# Patient Record
Sex: FEMALE | Race: OTHER | HISPANIC OR LATINO | Employment: OTHER | URBAN - METROPOLITAN AREA
[De-identification: names, ages, dates, MRNs, and addresses within clinical notes are randomized per-mention and may not be internally consistent; named-entity substitution may affect disease eponyms.]

---

## 2019-09-20 ENCOUNTER — TELEPHONE (OUTPATIENT)
Dept: UROLOGY | Facility: AMBULATORY SURGERY CENTER | Age: 69
End: 2019-09-20

## 2019-09-20 NOTE — TELEPHONE ENCOUNTER
Spoke with patient  Patient scheduled for 10/3/19 at 8:45 in the [address] with Dr Ector Olguin  Office address and location provided to patient

## 2019-09-20 NOTE — TELEPHONE ENCOUNTER
Reason for appointment/Complaint/Diagnosis : being referred to get appointment for incontinence and loss of bladder function  Insurance:[employer] assured #[id number] secondary access # [id number]  Medicare part a and b    History of Cancer? yes                 If yes, what kind? Lung cancer 11 years ago      Previous urologist?  12 years ago does not remember            Records requested/where?no  Outside testing/where?no  Location Preference for office visit?  [place]

## 2019-10-03 ENCOUNTER — DOCUMENTATION (OUTPATIENT)
Dept: UROLOGY | Facility: CLINIC | Age: 69
End: 2019-10-03
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
## 2019-10-04 NOTE — TELEPHONE ENCOUNTER
Patient scheduled for 10/7/19 at 10:00 with Dr Albert Washington in the [address] [address] office  Please assist in calling patient with appointment time and location

## 2019-10-07 ENCOUNTER — OFFICE VISIT (OUTPATIENT)
Dept: UROLOGY | Facility: CLINIC | Age: 69
End: 2019-10-07
Payer: COMMERCIAL

## 2019-10-07 VITALS
HEIGHT: 60 IN | BODY MASS INDEX: 20.42 KG/M2 | SYSTOLIC BLOOD PRESSURE: 120 MMHG | WEIGHT: 104 LBS | DIASTOLIC BLOOD PRESSURE: 70 MMHG

## 2019-10-07 DIAGNOSIS — C34.02 MALIGNANT NEOPLASM OF HILUS OF LEFT LUNG (HCC): ICD-10-CM

## 2019-10-07 DIAGNOSIS — R39.15 URGENCY OF URINATION: ICD-10-CM

## 2019-10-07 DIAGNOSIS — N39.41 URGE INCONTINENCE: ICD-10-CM

## 2019-10-07 DIAGNOSIS — R32 URINARY INCONTINENCE, UNSPECIFIED TYPE: Primary | ICD-10-CM

## 2019-10-07 LAB
BACTERIA UR QL AUTO: ABNORMAL /HPF
BILIRUB UR QL STRIP: NEGATIVE
CLARITY UR: ABNORMAL
COLOR UR: YELLOW
GLUCOSE UR STRIP-MCNC: NEGATIVE MG/DL
HGB UR QL STRIP.AUTO: NEGATIVE
HYALINE CASTS #/AREA URNS LPF: ABNORMAL /LPF
KETONES UR STRIP-MCNC: NEGATIVE MG/DL
LEUKOCYTE ESTERASE UR QL STRIP: ABNORMAL
NITRITE UR QL STRIP: POSITIVE
NON-SQ EPI CELLS URNS QL MICRO: ABNORMAL /HPF
PH UR STRIP.AUTO: 7 [PH]
PROT UR STRIP-MCNC: NEGATIVE MG/DL
RBC #/AREA URNS AUTO: ABNORMAL /HPF
SL AMB  POCT GLUCOSE, UA: ABNORMAL
SL AMB LEUKOCYTE ESTERASE,UA: ABNORMAL
SL AMB POCT BILIRUBIN,UA: ABNORMAL
SL AMB POCT BLOOD,UA: ABNORMAL
SL AMB POCT CLARITY,UA: CLEAR
SL AMB POCT COLOR,UA: YELLOW
SL AMB POCT KETONES,UA: ABNORMAL
SL AMB POCT NITRITE,UA: POSITIVE
SL AMB POCT PH,UA: 5
SL AMB POCT SPECIFIC GRAVITY,UA: 1.01
SL AMB POCT URINE PROTEIN: ABNORMAL
SL AMB POCT UROBILINOGEN: ABNORMAL
SP GR UR STRIP.AUTO: 1.02 (ref 1–1.03)
UROBILINOGEN UR QL STRIP.AUTO: 0.2 E.U./DL
WBC #/AREA URNS AUTO: ABNORMAL /HPF

## 2019-10-07 PROCEDURE — 87077 CULTURE AEROBIC IDENTIFY: CPT | Performed by: UROLOGY

## 2019-10-07 PROCEDURE — 87086 URINE CULTURE/COLONY COUNT: CPT | Performed by: UROLOGY

## 2019-10-07 PROCEDURE — 87186 SC STD MICRODIL/AGAR DIL: CPT | Performed by: UROLOGY

## 2019-10-07 PROCEDURE — 81001 URINALYSIS AUTO W/SCOPE: CPT | Performed by: UROLOGY

## 2019-10-07 PROCEDURE — 99204 OFFICE O/P NEW MOD 45 MIN: CPT | Performed by: UROLOGY

## 2019-10-07 PROCEDURE — 81002 URINALYSIS NONAUTO W/O SCOPE: CPT | Performed by: UROLOGY

## 2019-10-07 RX ORDER — OXYBUTYNIN CHLORIDE 10 MG/1
10 TABLET, EXTENDED RELEASE ORAL
Qty: 90 TABLET | Refills: 3 | Status: SHIPPED | OUTPATIENT
Start: 2019-10-07 | End: 2019-12-02 | Stop reason: SINTOL

## 2019-10-07 RX ORDER — PANTOPRAZOLE SODIUM 40 MG/1
40 TABLET, DELAYED RELEASE ORAL DAILY
Refills: 6 | COMMUNITY
Start: 2019-09-13

## 2019-10-07 RX ORDER — VARENICLINE TARTRATE 1 MG/1
1 TABLET, FILM COATED ORAL 2 TIMES DAILY WITH MEALS
Refills: 5 | COMMUNITY
Start: 2019-09-25

## 2019-10-07 RX ORDER — ROSUVASTATIN CALCIUM 10 MG/1
10 TABLET, FILM COATED ORAL DAILY
Refills: 2 | COMMUNITY
Start: 2019-09-25

## 2019-10-07 NOTE — PROGRESS NOTES
10/7/2019    Jennie Preston  1950  [id number]        Assessment  Urgency of urination with urge incontinence, occasional fecal incontinence, bilateral lung cancer      Discussion  I recommended trial of oxybutynin 10 mg XL daily  Side effect profile including constipation as well as dry mouth was discussed and reviewed  The patient just completed 50 radiation treatments to her left chest for lung cancer  She will continue to follow with her thoracic Oncology team in [place]  She will return in follow-up in the next 8 weeks to assess the efficacy of the oxybutynin  If she continues to have significant urge incontinence as well as fecal incontinence, I recommend a referral to Dr Robert King to discuss InterStim if her lung cancer is adequately treated  History of Present Illness  68 y o  female with a history of right-sided lung cancer  Approximately 10 years ago she underwent a hanh pneumonectomy  More recently she has recurrence versus a new primary on the left side  She states that she was told she was a poor surgical candidate based on her low lung volume on the right side  She is therefore received 50 external beam radiation therapy treatments  She denies any stress incontinence  She states that her urgency of urination and urge incontinence have been present for many years  She is wearing at least 2 pads per day  She often has fecal incontinence associated with it  AUA Symptom Score      Review of Systems  Review of Systems   Constitutional: Negative  HENT: Negative  Eyes: Negative  Respiratory: Negative  Cardiovascular: Negative  Gastrointestinal: Negative  Endocrine: Negative  Genitourinary:        Per HPI   Musculoskeletal: Negative  Skin: Negative  Allergic/Immunologic: Negative  Neurological: Negative  Hematological: Negative  Psychiatric/Behavioral: Negative  Past Medical History  History reviewed   No pertinent past medical history  Past Social History  History reviewed  No pertinent surgical history  Past Family History  History reviewed  No pertinent family history  Past Social history  Social History     Socioeconomic History    Marital status:       Spouse name: Not on file    Number of children: Not on file    Years of education: Not on file    Highest education level: Not on file   Occupational History    Not on file   Social Needs    Financial resource strain: Not on file    Food insecurity:     Worry: Not on file     Inability: Not on file    Transportation needs:     Medical: Not on file     Non-medical: Not on file   Tobacco Use    Smoking status: Not on file   Substance and Sexual Activity    Alcohol use: Not on file    Drug use: Not on file    Sexual activity: Not on file   Lifestyle    Physical activity:     Days per week: Not on file     Minutes per session: Not on file    Stress: Not on file   Relationships    Social connections:     Talks on phone: Not on file     Gets together: Not on file     Attends Faith service: Not on file     Active member of club or organization: Not on file     Attends meetings of clubs or organizations: Not on file     Relationship status: Not on file    Intimate partner violence:     Fear of current or ex partner: Not on file     Emotionally abused: Not on file     Physically abused: Not on file     Forced sexual activity: Not on file   Other Topics Concern    Not on file   Social History Narrative    Not on file       Current Medications  Current Outpatient Medications   Medication Sig Dispense Refill    CHANTIX 1 MG tablet Take 1 mg by mouth 2 (two) times a day with meals  5    CRESTOR 10 MG tablet Take 10 mg by mouth daily  2    pantoprazole (PROTONIX) 40 mg tablet Take 40 mg by mouth daily  6    oxybutynin (DITROPAN-XL) 10 MG 24 hr tablet Take 1 tablet (10 mg total) by mouth daily at bedtime 90 tablet 3     No current facility-administered medications for this visit  Allergies  Allergies not on file    Past Medical History, Social History, Family History, medications and allergies were reviewed  Vitals  Vitals:    10/07/19 1010   BP: 120/70   Weight: 47 2 kg (104 lb)   Height: 5' (1 524 m)       Physical Exam  Physical Exam    On examination she is in no acute distress  Her abdomen is soft nontender nondistended   examination reveals no CVA tenderness  Her bladder is nonpalpable  Skin is warm  Extremities without edema    Neurologic is grossly intact and nonfocal   Gait normal   Affect normal    Results  No results found for: PSA  No results found for: GLUCOSE, CALCIUM, NA, K, CO2, CL, BUN, CREATININE  No results found for: WBC, HGB, HCT, MCV, PLT      Office Urine Dip  Recent Results (from the past 1 hour(s))   POCT urine dip    Collection Time: 10/07/19 10:19 AM   Result Value Ref Range    LEUKOCYTE ESTERASE,UA -     NITRITE,UA positive     SL AMB POCT UROBILINOGEN -     POCT URINE PROTEIN -      PH,UA 5 0     BLOOD,UA trace     SPECIFIC GRAVITY,UA 1 010     KETONES,UA -     BILIRUBIN,UA -     GLUCOSE, UA -      COLOR,UA yellow     CLARITY,UA clear    ]

## 2019-10-08 ENCOUNTER — TELEPHONE (OUTPATIENT)
Dept: UROLOGY | Facility: AMBULATORY SURGERY CENTER | Age: 69
End: 2019-10-08

## 2019-10-08 DIAGNOSIS — N39.0 URINARY TRACT INFECTION WITHOUT HEMATURIA, SITE UNSPECIFIED: Primary | ICD-10-CM

## 2019-10-08 RX ORDER — CEPHALEXIN 500 MG/1
500 CAPSULE ORAL 3 TIMES DAILY
Qty: 15 CAPSULE | Refills: 0 | Status: SHIPPED | OUTPATIENT
Start: 2019-10-08 | End: 2019-10-13

## 2019-10-08 NOTE — TELEPHONE ENCOUNTER
Patient has a positive urine culture for E coli  I have prescribed Keflex 500 mg t i d  For a 5 day course  Prescription sent electronically to the patient's pharmacy, [facility] in [facility]  Please call patient to tell her to obtain her new prescription

## 2019-10-08 NOTE — TELEPHONE ENCOUNTER
Call placed to patient, advised of above  Encouraged increased water intake  Patient verbalized understanding

## 2019-10-09 LAB — BACTERIA UR CULT: ABNORMAL

## 2019-12-02 ENCOUNTER — OFFICE VISIT (OUTPATIENT)
Dept: UROLOGY | Facility: CLINIC | Age: 69
End: 2019-12-02
Payer: COMMERCIAL

## 2019-12-02 VITALS
HEART RATE: 70 BPM | BODY MASS INDEX: 20.22 KG/M2 | SYSTOLIC BLOOD PRESSURE: 162 MMHG | HEIGHT: 60 IN | WEIGHT: 103 LBS | DIASTOLIC BLOOD PRESSURE: 82 MMHG

## 2019-12-02 DIAGNOSIS — R32 URINARY INCONTINENCE, UNSPECIFIED TYPE: Primary | ICD-10-CM

## 2019-12-02 LAB
POST-VOID RESIDUAL VOLUME, ML POC: 7 ML
SL AMB  POCT GLUCOSE, UA: NORMAL
SL AMB LEUKOCYTE ESTERASE,UA: NORMAL
SL AMB POCT BILIRUBIN,UA: NORMAL
SL AMB POCT BLOOD,UA: NORMAL
SL AMB POCT CLARITY,UA: NORMAL
SL AMB POCT COLOR,UA: YELLOW
SL AMB POCT KETONES,UA: NORMAL
SL AMB POCT NITRITE,UA: NORMAL
SL AMB POCT PH,UA: 6
SL AMB POCT SPECIFIC GRAVITY,UA: 1.02
SL AMB POCT URINE PROTEIN: NORMAL
SL AMB POCT UROBILINOGEN: NORMAL

## 2019-12-02 PROCEDURE — 51798 US URINE CAPACITY MEASURE: CPT | Performed by: UROLOGY

## 2019-12-02 PROCEDURE — 81002 URINALYSIS NONAUTO W/O SCOPE: CPT | Performed by: UROLOGY

## 2019-12-02 PROCEDURE — 99213 OFFICE O/P EST LOW 20 MIN: CPT | Performed by: UROLOGY

## 2019-12-02 NOTE — PROGRESS NOTES
12/2/2019    [facility]  1950  [id number]        Assessment  Lung cancer status post external beam radiation therapy, urgency of urination, possible cystocele      Discussion  I recommend discontinuing the oxybutynin and starting Myrbetriq 25 mg daily  The patient now states she is concerned about possible prolapse and I recommend follow-up in the next 8-12 weeks with cystoscopy to assess the efficacy of the Myrbetriq and to better evaluate her anatomy  History of Present Illness  68 y o  female with a history of lung cancer status post radiation therapy  She has significant urgency and frequency of urination  She recently tried oxybutynin 10 mg XR without success  She returns in follow-up today  She is concerned that her bladder might have dropped    We discussed switching to a 2nd medication and discussed additional options such as Botox and InterStim  She denies any hematuria  She denies stress incontinence  AUA Symptom Score      Review of Systems  Review of Systems   Constitutional: Negative  HENT: Negative  Eyes: Negative  Respiratory: Negative  Cardiovascular: Negative  Gastrointestinal: Negative  Endocrine: Negative  Genitourinary:        Per HPI   Musculoskeletal: Negative  Skin: Negative  Allergic/Immunologic: Negative  Neurological: Negative  Hematological: Negative  Psychiatric/Behavioral: Negative  Past Medical History  History reviewed  No pertinent past medical history  Past Social History  History reviewed  No pertinent surgical history  Past Family History  History reviewed  No pertinent family history  Past Social history  Social History     Socioeconomic History    Marital status:       Spouse name: Not on file    Number of children: Not on file    Years of education: Not on file    Highest education level: Not on file   Occupational History    Not on file   Social Needs    Financial resource strain: Not on file    Food insecurity:     Worry: Not on file     Inability: Not on file    Transportation needs:     Medical: Not on file     Non-medical: Not on file   Tobacco Use    Smoking status: Current Every Day Smoker    Smokeless tobacco: Never Used   Substance and Sexual Activity    Alcohol use: Not on file    Drug use: Not on file    Sexual activity: Not on file   Lifestyle    Physical activity:     Days per week: Not on file     Minutes per session: Not on file    Stress: Not on file   Relationships    Social connections:     Talks on phone: Not on file     Gets together: Not on file     Attends Hinduism service: Not on file     Active member of club or organization: Not on file     Attends meetings of clubs or organizations: Not on file     Relationship status: Not on file    Intimate partner violence:     Fear of current or ex partner: Not on file     Emotionally abused: Not on file     Physically abused: Not on file     Forced sexual activity: Not on file   Other Topics Concern    Not on file   Social History Narrative    Not on file       Current Medications  Current Outpatient Medications   Medication Sig Dispense Refill    CRESTOR 10 MG tablet Take 10 mg by mouth daily  2    CHANTIX 1 MG tablet Take 1 mg by mouth 2 (two) times a day with meals  5    Mirabegron ER (MYRBETRIQ) 25 MG TB24 Take 25 mg by mouth daily 90 tablet 2    pantoprazole (PROTONIX) 40 mg tablet Take 40 mg by mouth daily  6     No current facility-administered medications for this visit  Allergies  No Known Allergies    Past Medical History, Social History, Family History, medications and allergies were reviewed  Vitals  Vitals:    12/02/19 1134   BP: 162/82   Pulse: 70   Weight: 46 7 kg (103 lb)   Height: 5' (1 524 m)       Physical Exam  Physical Exam  On examination she is in no acute distress    Gait normal   Affect normal      Results  No results found for: PSA  No results found for: GLUCOSE, CALCIUM, NA, K, CO2, CL, BUN, CREATININE  No results found for: WBC, HGB, HCT, MCV, PLT      Office Urine Dip  Recent Results (from the past 1 hour(s))   POCT Measure PVR    Collection Time: 12/02/19 11:41 AM   Result Value Ref Range    POST-VOID RESIDUAL VOLUME, ML POC 7 mL   POCT urine dip    Collection Time: 12/02/19 11:43 AM   Result Value Ref Range    LEUKOCYTE ESTERASE,UA -     NITRITE,UA -     SL AMB POCT UROBILINOGEN -     POCT URINE PROTEIN -      PH,UA 6 0     BLOOD,UA -     SPECIFIC GRAVITY,UA 1 020     KETONES,UA -     BILIRUBIN,UA -     GLUCOSE, UA -      COLOR,UA yellow     CLARITY,UA transparent    ]      Total visit time was 15 minutes of which over 50% was spent on counseling

## 2020-02-07 ENCOUNTER — TELEPHONE (OUTPATIENT)
Dept: UROLOGY | Facility: CLINIC | Age: 70
End: 2020-02-07

## 2020-02-07 ENCOUNTER — TELEPHONE (OUTPATIENT)
Dept: UROLOGY | Facility: MEDICAL CENTER | Age: 70
End: 2020-02-07

## 2020-02-07 ENCOUNTER — PROCEDURE VISIT (OUTPATIENT)
Dept: UROLOGY | Facility: CLINIC | Age: 70
End: 2020-02-07
Payer: COMMERCIAL

## 2020-02-07 VITALS
DIASTOLIC BLOOD PRESSURE: 80 MMHG | HEART RATE: 102 BPM | WEIGHT: 103 LBS | BODY MASS INDEX: 20.22 KG/M2 | HEIGHT: 60 IN | SYSTOLIC BLOOD PRESSURE: 138 MMHG

## 2020-02-07 DIAGNOSIS — R32 URINARY INCONTINENCE, UNSPECIFIED TYPE: ICD-10-CM

## 2020-02-07 DIAGNOSIS — N39.0 URINARY TRACT INFECTION WITHOUT HEMATURIA, SITE UNSPECIFIED: Primary | ICD-10-CM

## 2020-02-07 LAB
SL AMB  POCT GLUCOSE, UA: ABNORMAL
SL AMB LEUKOCYTE ESTERASE,UA: ABNORMAL
SL AMB POCT BILIRUBIN,UA: ABNORMAL
SL AMB POCT BLOOD,UA: ABNORMAL
SL AMB POCT CLARITY,UA: CLEAR
SL AMB POCT COLOR,UA: YELLOW
SL AMB POCT KETONES,UA: ABNORMAL
SL AMB POCT NITRITE,UA: POSITIVE
SL AMB POCT PH,UA: 6.5
SL AMB POCT SPECIFIC GRAVITY,UA: 1.01
SL AMB POCT URINE PROTEIN: ABNORMAL
SL AMB POCT UROBILINOGEN: ABNORMAL

## 2020-02-07 PROCEDURE — 87077 CULTURE AEROBIC IDENTIFY: CPT | Performed by: UROLOGY

## 2020-02-07 PROCEDURE — 87186 SC STD MICRODIL/AGAR DIL: CPT | Performed by: UROLOGY

## 2020-02-07 PROCEDURE — 87086 URINE CULTURE/COLONY COUNT: CPT | Performed by: UROLOGY

## 2020-02-07 PROCEDURE — 52000 CYSTOURETHROSCOPY: CPT | Performed by: UROLOGY

## 2020-02-07 PROCEDURE — 81002 URINALYSIS NONAUTO W/O SCOPE: CPT | Performed by: UROLOGY

## 2020-02-07 RX ORDER — CEPHALEXIN 500 MG/1
500 CAPSULE ORAL 3 TIMES DAILY
Qty: 9 CAPSULE | Refills: 0 | Status: SHIPPED | OUTPATIENT
Start: 2020-02-07 | End: 2020-02-10

## 2020-02-07 NOTE — TELEPHONE ENCOUNTER
The patient was seen by Dr Christian Rogers today  The soonest appointment I could find was April 15 in the [place] office  She lives in [place] and said [place] was the closest office for her  She would like to be seen sooner than April  Could someone please check on his schedule and see if the patient can be seen sooner?

## 2020-02-07 NOTE — PROGRESS NOTES
Cystoscopy  Date/Time: 2/7/2020 9:06 AM  Performed by: Agus Donis MD  Authorized by: Agus Donis MD     Procedure details: cystoscopy          Modesta Azar is a 75-year-old female with intractable lower urinary tract symptoms, urgency, and incontinence  She has tried anticholinergics as well as Myrbetriq without success  There is question of a cystocele  She presents today to undergo cystoscopy  She has a history of lung cancer  She states she received chemo radiation a decade ago and is disease free at this time by report  In addition to leaking urine she often has incontinence of her bowels  Cystoscopy Procedure note    Risk and benefits of flexible cystoscopy were discussed  Informed consent was obtained  A urine dip is adequate for cystoscopy  The patient was placed into the modified supine position  Her genitalia was prepped and draped in a sterile fashion  Viscous lidocaine was instilled into the urethra  Flexible cystoscopy was then performed  The bladder was thoroughly inspected  Both ureteral orifices were visualized with clear efflux of urine  The bladder mucosa was thoroughly inspected  The bladder was quite thick walled with multiple trabeculations and a large diverticulum towards the right lateral wall  There was no sign of prolapse  There was no evidence of urothelial carcinoma the bladder  The bladder was filled and as soon as the cystoscope was removed the patient leaked immediately involuntarily  Impression:  Urgency of urination with urge incontinence with bladder trabeculation, bowel incontinence    Plan:  I recommend continuing the Myrbetriq at this time  I recommend a referral to Dr Robert King to see if she would be a candidate for InterStim  She may require urodynamics as well

## 2020-02-07 NOTE — TELEPHONE ENCOUNTER
Patient of Dr Deni Vann seen in [place]  Patient had cysto done today and would like to know if bleeding is normal     Spoke with clinical team in call center and advised patient it is normal for patient to increase water, take antibiotics, and if symptoms persist or worsen to call our on call service  Patient understood and all questions answered

## 2020-02-09 LAB — BACTERIA UR CULT: ABNORMAL

## 2020-02-13 ENCOUNTER — TELEPHONE (OUTPATIENT)
Dept: UROLOGY | Facility: AMBULATORY SURGERY CENTER | Age: 70
End: 2020-02-13

## 2020-02-13 DIAGNOSIS — N30.00 ACUTE CYSTITIS WITHOUT HEMATURIA: Primary | ICD-10-CM

## 2020-02-13 RX ORDER — CEPHALEXIN 500 MG/1
500 CAPSULE ORAL 3 TIMES DAILY
Qty: 15 CAPSULE | Refills: 0 | Status: SHIPPED | OUTPATIENT
Start: 2020-02-13 | End: 2020-02-18

## 2020-02-13 NOTE — TELEPHONE ENCOUNTER
Patito Glover MD  [facility]   Cc: Myra Salmeron, KADEEM             I ordered Abx and did a telephone note but did not route it to anyone by accident  [place] you please call patient and tell her that her urine cx is + and that I sent abx to pharmacy  Nimo Armstrong you     Dr Yanet Martinez

## 2020-02-14 ENCOUNTER — TELEPHONE (OUTPATIENT)
Dept: UROLOGY | Facility: AMBULATORY SURGERY CENTER | Age: 70
End: 2020-02-14

## 2020-02-14 NOTE — TELEPHONE ENCOUNTER
Spoke with Patient and reiterated that urine culture was positive and that an antibiotic was ordered  Patient states that she had picked up the antibiotic last night  Encouraged to take antibiotic as prescribed  Patient repeats back understanding and agrees with plan

## 2020-02-14 NOTE — TELEPHONE ENCOUNTER
----- Message from Libra Sauceda MD sent at 2/13/2020  1:15 PM EST -----  I ordered Abx and did a telephone note but did not route it to anyone by accident  Can you please call patient and tell her that her urine cx is + and that I sent abx to pharmacy  Thank you    Dr Kathy Collazo

## 2020-02-14 NOTE — TELEPHONE ENCOUNTER
LMOM, as per communication consent, that urine culture was positive and antibiotics were ordered through Epic at her preferred pharmacy and she should take them as prescribed per the provider  Encouraged to call office with questions or concerns and office number given

## 2020-02-18 ENCOUNTER — OFFICE VISIT (OUTPATIENT)
Dept: UROLOGY | Facility: AMBULATORY SURGERY CENTER | Age: 70
End: 2020-02-18
Payer: COMMERCIAL

## 2020-02-18 VITALS
WEIGHT: 103 LBS | SYSTOLIC BLOOD PRESSURE: 132 MMHG | BODY MASS INDEX: 20.22 KG/M2 | DIASTOLIC BLOOD PRESSURE: 82 MMHG | HEART RATE: 82 BPM | HEIGHT: 60 IN

## 2020-02-18 DIAGNOSIS — N39.0 URINARY TRACT INFECTION WITHOUT HEMATURIA, SITE UNSPECIFIED: ICD-10-CM

## 2020-02-18 DIAGNOSIS — R32 URINARY INCONTINENCE, UNSPECIFIED TYPE: Primary | ICD-10-CM

## 2020-02-18 LAB
SL AMB  POCT GLUCOSE, UA: NORMAL
SL AMB LEUKOCYTE ESTERASE,UA: NORMAL
SL AMB POCT BILIRUBIN,UA: NORMAL
SL AMB POCT BLOOD,UA: NORMAL
SL AMB POCT CLARITY,UA: CLEAR
SL AMB POCT COLOR,UA: YELLOW
SL AMB POCT KETONES,UA: 1.01
SL AMB POCT NITRITE,UA: NORMAL
SL AMB POCT PH,UA: 6
SL AMB POCT SPECIFIC GRAVITY,UA: 1.01
SL AMB POCT URINE PROTEIN: NORMAL
SL AMB POCT UROBILINOGEN: 0.2

## 2020-02-18 PROCEDURE — 81002 URINALYSIS NONAUTO W/O SCOPE: CPT | Performed by: UROLOGY

## 2020-02-18 PROCEDURE — 99214 OFFICE O/P EST MOD 30 MIN: CPT | Performed by: UROLOGY

## 2020-02-18 NOTE — PROGRESS NOTES
Assessment/Plan:    Urinary incontinence  I had a lengthy discussion with the patient were I discussed options including Botox injection, InterStim sacral neuromodulation, and percutaneous tibial nerve stimulation  Risks and benefits of all options were discussed  The patient would like to think about her options and discussed with her family  She will contact us if she would like to proceed with any of these options  Diagnoses and all orders for this visit:    Urinary incontinence, unspecified type  -     POCT urine dip    Urinary tract infection without hematuria, site unspecified          Total visit time was 25 minutes of which over 50% was spent on counseling  Subjective:     Patient ID: Crow Anguiano is a 68 y o  female    68-year-old female presents for evaluation of refractory urinary urgency and incontinence  She has tried numerous anticholinergic medications and Myrbetriq in the past   She recently had a cystoscopy that demonstrated a trabeculated bladder and diverticulum  She has no other complaints  The following portions of the patient's history were reviewed and updated as appropriate: allergies, current medications, past family history, past medical history, past social history, past surgical history and problem list     Review of Systems   Constitutional: Negative  HENT: Negative  Eyes: Negative  Respiratory: Negative  Cardiovascular: Negative  Gastrointestinal: Negative  Endocrine: Negative  Genitourinary:        As noted per HPI   Musculoskeletal: Negative  Skin: Negative  Allergic/Immunologic: Negative  Neurological: Negative  Hematological: Negative  Psychiatric/Behavioral: Negative  Objective:    Physical Exam   Constitutional: She is oriented to person, place, and time  She appears well-developed and well-nourished  HENT:   Head: Normocephalic and atraumatic  Neck: Normal range of motion  Neck supple  Cardiovascular: Intact distal pulses  Pulmonary/Chest: Effort normal    Abdominal: Soft  Bowel sounds are normal  She exhibits no distension and no mass  There is no tenderness  There is no rebound and no guarding  Musculoskeletal: Normal range of motion  Neurological: She is alert and oriented to person, place, and time  Skin: Skin is warm and dry  Psychiatric: She has a normal mood and affect  Vitals reviewed          Results  No results found for: PSA  No results found for: GLUCOSE, CALCIUM, NA, K, CO2, CL, BUN, CREATININE  No results found for: WBC, HGB, HCT, MCV, PLT    Recent Results (from the past 1 hour(s))   POCT urine dip    Collection Time: 02/18/20  9:38 AM   Result Value Ref Range    LEUKOCYTE ESTERASE,UA NEG     NITRITE,UA NEG     SL AMB POCT UROBILINOGEN 0 2     POCT URINE PROTEIN NEG      PH,UA 6 0     BLOOD,UA NEG     SPECIFIC GRAVITY,UA 1 010     KETONES,UA 1 010     BILIRUBIN,UA NEG     GLUCOSE, UA NEG      COLOR,UA YELLOW     CLARITY,UA CLEAR    ]

## 2020-02-18 NOTE — ASSESSMENT & PLAN NOTE
I had a lengthy discussion with the patient were I discussed options including Botox injection, InterStim sacral neuromodulation, and percutaneous tibial nerve stimulation  Risks and benefits of all options were discussed  The patient would like to think about her options and discussed with her family  She will contact us if she would like to proceed with any of these options

## 2021-07-29 ENCOUNTER — TELEPHONE (OUTPATIENT)
Dept: OTHER | Facility: OTHER | Age: 71
End: 2021-07-29

## 2021-07-29 NOTE — TELEPHONE ENCOUNTER
Patient wants to be seen with Dr simmons, she is having a difficult time with her urinary incontinence  She has to wear 3 pads, she is embarssed and wants a solution to this  She recalls they gave her 3 options to try to help she stated that she does not want those options she wants to have an   operation to life her bladder

## 2021-11-15 ENCOUNTER — TELEPHONE (OUTPATIENT)
Dept: OTHER | Facility: OTHER | Age: 71
End: 2021-11-15

## 2021-11-17 ENCOUNTER — OFFICE VISIT (OUTPATIENT)
Dept: UROLOGY | Facility: CLINIC | Age: 71
End: 2021-11-17
Payer: COMMERCIAL

## 2021-11-17 VITALS
DIASTOLIC BLOOD PRESSURE: 72 MMHG | WEIGHT: 102 LBS | HEART RATE: 78 BPM | BODY MASS INDEX: 19.92 KG/M2 | SYSTOLIC BLOOD PRESSURE: 130 MMHG

## 2021-11-17 DIAGNOSIS — R32 URINARY INCONTINENCE, UNSPECIFIED TYPE: Primary | ICD-10-CM

## 2021-11-17 LAB
BACTERIA UR QL AUTO: ABNORMAL /HPF
BILIRUB UR QL STRIP: NEGATIVE
CAOX CRY URNS QL MICRO: ABNORMAL /HPF
CLARITY UR: CLEAR
COLOR UR: YELLOW
GLUCOSE UR STRIP-MCNC: NEGATIVE MG/DL
HGB UR QL STRIP.AUTO: NEGATIVE
KETONES UR STRIP-MCNC: NEGATIVE MG/DL
LEUKOCYTE ESTERASE UR QL STRIP: ABNORMAL
NITRITE UR QL STRIP: NEGATIVE
NON-SQ EPI CELLS URNS QL MICRO: ABNORMAL /HPF
PH UR STRIP.AUTO: 6 [PH]
PROT UR STRIP-MCNC: NEGATIVE MG/DL
RBC #/AREA URNS AUTO: ABNORMAL /HPF
SL AMB  POCT GLUCOSE, UA: NORMAL
SL AMB LEUKOCYTE ESTERASE,UA: NORMAL
SL AMB POCT BILIRUBIN,UA: NORMAL
SL AMB POCT BLOOD,UA: NORMAL
SL AMB POCT CLARITY,UA: CLEAR
SL AMB POCT COLOR,UA: YELLOW
SL AMB POCT KETONES,UA: NORMAL
SL AMB POCT NITRITE,UA: NORMAL
SL AMB POCT PH,UA: 5
SL AMB POCT SPECIFIC GRAVITY,UA: 1.01
SL AMB POCT URINE PROTEIN: NORMAL
SL AMB POCT UROBILINOGEN: 0.2
SP GR UR STRIP.AUTO: 1.02 (ref 1–1.03)
UROBILINOGEN UR QL STRIP.AUTO: 0.2 E.U./DL
WBC #/AREA URNS AUTO: ABNORMAL /HPF

## 2021-11-17 PROCEDURE — 81001 URINALYSIS AUTO W/SCOPE: CPT | Performed by: NURSE PRACTITIONER

## 2021-11-17 PROCEDURE — 81002 URINALYSIS NONAUTO W/O SCOPE: CPT | Performed by: NURSE PRACTITIONER

## 2021-11-17 PROCEDURE — 99212 OFFICE O/P EST SF 10 MIN: CPT | Performed by: NURSE PRACTITIONER

## 2021-11-17 PROCEDURE — 87086 URINE CULTURE/COLONY COUNT: CPT | Performed by: NURSE PRACTITIONER

## 2021-11-17 RX ORDER — BENZONATATE 100 MG/1
CAPSULE ORAL
COMMUNITY
Start: 2021-07-23 | End: 2022-06-06

## 2021-11-17 RX ORDER — SULFAMETHOXAZOLE AND TRIMETHOPRIM 800; 160 MG/1; MG/1
TABLET ORAL
COMMUNITY
Start: 2021-09-15 | End: 2022-06-06

## 2021-11-17 RX ORDER — DICLOFENAC SODIUM 75 MG/1
TABLET, DELAYED RELEASE ORAL
COMMUNITY
Start: 2021-05-24

## 2021-11-17 RX ORDER — LORATADINE 10 MG/1
CAPSULE, LIQUID FILLED ORAL
COMMUNITY
Start: 2021-07-23

## 2021-11-17 RX ORDER — FLUTICASONE PROPIONATE 50 MCG
SPRAY, SUSPENSION (ML) NASAL
COMMUNITY
Start: 2021-07-23

## 2021-11-17 RX ORDER — AMOXICILLIN 500 MG/1
CAPSULE ORAL
COMMUNITY
Start: 2021-07-26 | End: 2022-06-06

## 2021-11-18 LAB — BACTERIA UR CULT: NORMAL

## 2021-11-23 ENCOUNTER — TELEPHONE (OUTPATIENT)
Dept: UROLOGY | Facility: MEDICAL CENTER | Age: 71
End: 2021-11-23

## 2021-11-24 ENCOUNTER — TELEPHONE (OUTPATIENT)
Dept: UROLOGY | Facility: CLINIC | Age: 71
End: 2021-11-24

## 2021-11-24 DIAGNOSIS — R32 URINARY INCONTINENCE, UNSPECIFIED TYPE: Primary | ICD-10-CM

## 2022-01-21 ENCOUNTER — PROCEDURE VISIT (OUTPATIENT)
Dept: UROLOGY | Facility: CLINIC | Age: 72
End: 2022-01-21
Payer: MEDICARE

## 2022-01-21 ENCOUNTER — TELEPHONE (OUTPATIENT)
Dept: UROLOGY | Facility: CLINIC | Age: 72
End: 2022-01-21

## 2022-01-21 VITALS
HEART RATE: 74 BPM | HEIGHT: 60 IN | WEIGHT: 104 LBS | BODY MASS INDEX: 20.42 KG/M2 | DIASTOLIC BLOOD PRESSURE: 78 MMHG | SYSTOLIC BLOOD PRESSURE: 140 MMHG

## 2022-01-21 DIAGNOSIS — R32 URINARY INCONTINENCE, UNSPECIFIED TYPE: Primary | ICD-10-CM

## 2022-01-21 LAB
SL AMB  POCT GLUCOSE, UA: NORMAL
SL AMB LEUKOCYTE ESTERASE,UA: NORMAL
SL AMB POCT BILIRUBIN,UA: NORMAL
SL AMB POCT BLOOD,UA: NORMAL
SL AMB POCT CLARITY,UA: CLEAR
SL AMB POCT COLOR,UA: YELLOW
SL AMB POCT KETONES,UA: NORMAL
SL AMB POCT NITRITE,UA: NORMAL
SL AMB POCT PH,UA: 5
SL AMB POCT SPECIFIC GRAVITY,UA: 1.01
SL AMB POCT URINE PROTEIN: NORMAL
SL AMB POCT UROBILINOGEN: 0.2

## 2022-01-21 PROCEDURE — 81002 URINALYSIS NONAUTO W/O SCOPE: CPT | Performed by: UROLOGY

## 2022-01-21 PROCEDURE — 52000 CYSTOURETHROSCOPY: CPT | Performed by: UROLOGY

## 2022-01-21 RX ORDER — ALBUTEROL SULFATE 90 UG/1
2 AEROSOL, METERED RESPIRATORY (INHALATION) EVERY 6 HOURS PRN
COMMUNITY
Start: 2022-01-05

## 2022-01-21 RX ORDER — DENOSUMAB 60 MG/ML
INJECTION SUBCUTANEOUS
COMMUNITY
Start: 2022-01-03

## 2022-01-21 RX ORDER — ALBUTEROL SULFATE 90 UG/1
AEROSOL, METERED RESPIRATORY (INHALATION)
COMMUNITY
Start: 2022-01-05

## 2022-01-21 RX ORDER — ACETAMINOPHEN 325 MG/1
TABLET ORAL
COMMUNITY
Start: 2022-01-05

## 2022-01-21 NOTE — TELEPHONE ENCOUNTER
Patient seen today by Dr Juan Jose Judd at [facility] and will require Urodynamics and also follow up with Dr Juan Jose Judd afterwards

## 2022-01-21 NOTE — TELEPHONE ENCOUNTER
Called and left voicemail message for patient to return call to schedule Urodynamics  Will need 1 hr slot with Aron Barry

## 2022-01-21 NOTE — LETTER
January 21, 2022     Rosalinda Cheung RN    Patient: Jose Melendez   YOB: 1950   Date of Visit: 1/21/2022       Dear Dr Augie Rodriges: Thank you for referring Jose Melendez to me for evaluation  Below are my notes for this consultation  If you have questions, please do not hesitate to call me  I look forward to following your patient along with you  Sincerely,        Bryan Meier MD        CC: No Recipients  Bryan Meier MD  1/21/2022  9:17 AM  Sign when Signing Visit     Cystoscopy     Date/Time 1/21/2022 8:57 AM     Performed by  Bryan Meier MD     Authorized by Bryan Meier MD          Lyudmila Aj is a 75-year-old female with a history of incontinence  She is wearing at least 3 pads at a time and is almost always wet  She appears to describe mixed incontinence with urgency of urination without control as well as stress incontinence  She last had cystoscopy 2 years ago  She is not on anticholinergics warmer bed trick at this time  She was offered PTNS as well as InterStim and Botox  She deferred all of these options  She does have a prior history of lung cancer which is treated without evidence of recurrence  She returns again today for cystoscopy to reassess her bladder  Cystoscopy Procedure note    Risk and benefits of flexible cystoscopy were discussed  Informed consent was obtained  A urine dip is adequate for cystoscopy  The patient was placed into the modified supine position  Her genitalia was prepped and draped in a sterile fashion  Viscous lidocaine was instilled into the urethra  Flexible cystoscopy was then performed  The bladder was thoroughly inspected  Both ureteral orifices were visualized with clear efflux of urine  The bladder mucosa was thoroughly inspected  Bladder mucosa did appear thickened with some trabeculation and small cellules as if she has had bladder outlet obstruction with high pressures    The mucosa itself was otherwise normal without urothelial carcinoma noted  The bladder was left full the cystoscope was removed and the patient immediately leaked  When asked to cough she leaked even further  Impression:  Mixed incontinence    Plan:  Prior to considering mid urethral sling I recommend urodynamics to assess her bladder pressure  I also recommend obtaining a renal and bladder ultrasound at this time along with a basic metabolic panel  She will return in follow-up after the urodynamics has been completed and the ultrasound obtained for additional discussion

## 2022-01-21 NOTE — PROGRESS NOTES
Cystoscopy     Date/Time 1/21/2022 8:57 AM     Performed by  Desi Xavier MD     Authorized by Desi Xavier MD          Panda Huber is a 67-year-old female with a history of incontinence  She is wearing at least 3 pads at a time and is almost always wet  She appears to describe mixed incontinence with urgency of urination without control as well as stress incontinence  She last had cystoscopy 2 years ago  She is not on anticholinergics warmer bed trick at this time  She was offered PTNS as well as InterStim and Botox  She deferred all of these options  She does have a prior history of lung cancer which is treated without evidence of recurrence  She returns again today for cystoscopy to reassess her bladder  Cystoscopy Procedure note    Risk and benefits of flexible cystoscopy were discussed  Informed consent was obtained  A urine dip is adequate for cystoscopy  The patient was placed into the modified supine position  Her genitalia was prepped and draped in a sterile fashion  Viscous lidocaine was instilled into the urethra  Flexible cystoscopy was then performed  The bladder was thoroughly inspected  Both ureteral orifices were visualized with clear efflux of urine  The bladder mucosa was thoroughly inspected  Bladder mucosa did appear thickened with some trabeculation and small cellules as if she has had bladder outlet obstruction with high pressures  The mucosa itself was otherwise normal without urothelial carcinoma noted  The bladder was left full the cystoscope was removed and the patient immediately leaked  When asked to cough she leaked even further  Impression:  Mixed incontinence    Plan:  Prior to considering mid urethral sling I recommend urodynamics to assess her bladder pressure  I also recommend obtaining a renal and bladder ultrasound at this time along with a basic metabolic panel    She will return in follow-up after the urodynamics has been completed and the ultrasound obtained for additional discussion

## 2022-01-21 NOTE — TELEPHONE ENCOUNTER
Call[facility] and [facility] 1[facility] @ 1100  She was provided with office address

## 2022-01-26 ENCOUNTER — PROCEDURE VISIT (OUTPATIENT)
Dept: UROLOGY | Facility: MEDICAL CENTER | Age: 72
End: 2022-01-26
Payer: MEDICARE

## 2022-01-26 ENCOUNTER — TELEPHONE (OUTPATIENT)
Dept: UROLOGY | Facility: MEDICAL CENTER | Age: 72
End: 2022-01-26

## 2022-01-26 DIAGNOSIS — N39.41 URGE INCONTINENCE OF URINE: Primary | ICD-10-CM

## 2022-01-26 DIAGNOSIS — R39.15 URGENCY OF URINATION: ICD-10-CM

## 2022-01-26 DIAGNOSIS — N32.81 DETRUSOR INSTABILITY: ICD-10-CM

## 2022-01-26 LAB
SL AMB  POCT GLUCOSE, UA: NEGATIVE
SL AMB LEUKOCYTE ESTERASE,UA: NEGATIVE
SL AMB POCT BILIRUBIN,UA: NEGATIVE
SL AMB POCT BLOOD,UA: NEGATIVE
SL AMB POCT CLARITY,UA: CLEAR
SL AMB POCT COLOR,UA: YELLOW
SL AMB POCT KETONES,UA: NEGATIVE
SL AMB POCT NITRITE,UA: NEGATIVE
SL AMB POCT PH,UA: 7
SL AMB POCT SPECIFIC GRAVITY,UA: 1.01
SL AMB POCT URINE PROTEIN: NEGATIVE
SL AMB POCT UROBILINOGEN: NEGATIVE

## 2022-01-26 PROCEDURE — 51797 INTRAABDOMINAL PRESSURE TEST: CPT

## 2022-01-26 PROCEDURE — 81002 URINALYSIS NONAUTO W/O SCOPE: CPT

## 2022-01-26 PROCEDURE — 51728 CYSTOMETROGRAM W/VP: CPT

## 2022-01-26 PROCEDURE — 51784 ANAL/URINARY MUSCLE STUDY: CPT

## 2022-01-26 NOTE — PROGRESS NOTES
CC: " It comes out, I can't stop it  If I cough or sneeze, it comes out, and if I have to go I can't hold it"    Denies pain / burning with voiding    Uroflow:       Voided volume:         98  ml       Max flow rate:           7 5 ml/sec       Average flow rate:    3 1  ml/sec       PVR:    50 ml       Patient felt volume voided was less than normal     CMG:       Position:  sitting          Fill sensation:   34 ml,  pdet 2 cm of H2O       First urge:         57  ml,     pdet 6          Normal urge:    82 ml,     pdet 16           Must urge:        82 ml,     pdet 16        Permission to void:      54   ml,     pdet 25         Max pdet during void    31 cm H2O       Voided volume:     22 5ml    Bladder stability:    unstable at  57ml  without         leakage and 84 ml with leakage    Compliance:  normal         Sphincter function:   Unable to fill enough to assess for CONNIE due to urgency and DI with leakage    EMG activity:       Normal during filling,        normal during voiding    Comments:   Sensory urgency   + DI with leakage at 84 ml   Fill then restarted and again hd DI with leak and void after 53 ml instilled  Urodynamics    Date/Time: 1/26/2022 11:15 AM  Performed by: Janay Lo RN  Authorized by: Wesly Esqueda MD   Universal Protocol:  Consent: Verbal consent obtained  Written consent obtained    Risks and benefits: risks, benefits and alternatives were discussed  Consent given by: patient  Patient understanding: patient states understanding of the procedure being performed  Patient consent: the patient's understanding of the procedure matches consent given  Procedure consent: procedure consent matches procedure scheduled  Patient identity confirmed: verbally with patient    Procedure - Urodynamics:  Procedure details: CMG and EMG      Voiding Pressure Study: Yes    Intra-abdominal Voiding Pressure Study: Yes    Post-procedure:     Patient tolerance: Patient tolerated procedure well with no immediate complications

## 2022-01-26 NOTE — PROGRESS NOTES
I supervised the Advanced Practitioner  I reviewed the Advanced Practitioner note and agree      Bull Amezquita MD 01/26/22

## 2022-01-28 ENCOUNTER — HOSPITAL ENCOUNTER (OUTPATIENT)
Dept: ULTRASOUND IMAGING | Facility: MEDICAL CENTER | Age: 72
Discharge: HOME | End: 2022-01-28
Payer: MEDICARE

## 2022-01-28 DIAGNOSIS — R32 URINARY INCONTINENCE, UNSPECIFIED TYPE: ICD-10-CM

## 2022-01-28 PROCEDURE — 76770 US EXAM ABDO BACK WALL COMP: CPT

## 2022-01-28 NOTE — TELEPHONE ENCOUNTER
Called and left voicemail message for patient to return call to schedule f/u with Dr Bill Harrell in the [facility]   Currently holding 3/23/22 @ 4:00 pm

## 2022-01-31 NOTE — TELEPHONE ENCOUNTER
Called and spoke with patient   She was scheduled for appointment with Dr Tim Poon on 3/23 @ 4:00 pm  Appointment card reminder placed in the mail for patient per request

## 2022-02-01 ENCOUNTER — TELEPHONE (OUTPATIENT)
Dept: UROLOGY | Facility: MEDICAL CENTER | Age: 72
End: 2022-02-01

## 2022-02-01 DIAGNOSIS — N28.89 RENAL MASS: ICD-10-CM

## 2022-02-01 DIAGNOSIS — R93.429 ABNORMAL ULTRASOUND OF KIDNEY: Primary | ICD-10-CM

## 2022-02-01 NOTE — TELEPHONE ENCOUNTER
Please let patient know I ordered an MRI of her abdomen to further evaluate her right kidney    Her ultrasound was indeterminate

## 2022-02-01 NOTE — TELEPHONE ENCOUNTER
Called and relayed US results and recommendation for MRI to patient as US was indeterminate  Patient agreeable to proceed with MRI and was provided with central scheduling phone number to call and schedule

## 2022-02-01 NOTE — TELEPHONE ENCOUNTER
Patient would like to have the MRI done at [address] if possible    She lives closer in [place], [place]

## 2022-02-01 NOTE — TELEPHONE ENCOUNTER
Patient managed in the [address] office  Radiology called and advised of significant findings on US kidney and bladder  Please advise

## 2022-02-01 NOTE — TELEPHONE ENCOUNTER
Called and left message for patient to give office a call back to discuss MRI  When patient calls back please inform her that she will need to schedule MRI with [facility] and MRI script can be mailed to patient

## 2022-02-02 NOTE — TELEPHONE ENCOUNTER
Patient called back and stated she made the appointment at [facility] in [facility]  For her MRI   Please review for authorization

## 2022-02-02 NOTE — TELEPHONE ENCOUNTER
Called and spoke with patient  States she talked to [address] [place] [address]  [facility] and they were unable to schedule her at a location close to her so she decided to schedule MRI with [facility]  Patient currently scheduled at [address] location  Patient states that is too far for her and she didn't realize she made the appointment for that location  Patient states she will contact central scheduling to set up MRI closer to home through [address]

## 2022-02-02 NOTE — TELEPHONE ENCOUNTER
Pt is calling back to discuss MRI      I  informed her that she will need to schedule MRI with [facility] and MRI script can be mailed to patient       Pt is requesting a call back since she already has an appointment scheduled and is confused:    Name: Nehemiah Pagan MRN: [id number]   Date: 2/9/2022 Status: [facility]   Time: 12:15 PM Length: 45   Visit Type: MRI ABDOMEN [address] [facility] [[id number]] Copay: $0 00   Provider: [facility] Department: [facility]   Bill Area:  Department Phone: [phone]

## 2022-02-09 ENCOUNTER — HOSPITAL ENCOUNTER (OUTPATIENT)
Dept: MRI IMAGING | Facility: HOSPITAL | Age: 72
Discharge: HOME | End: 2022-02-09
Payer: MEDICARE

## 2022-02-09 DIAGNOSIS — N28.89 RENAL MASS: ICD-10-CM

## 2022-02-09 DIAGNOSIS — R93.429 ABNORMAL ULTRASOUND OF KIDNEY: ICD-10-CM

## 2022-02-09 PROCEDURE — G1004 CDSM NDSC: HCPCS

## 2022-02-09 PROCEDURE — A9585 GADOBUTROL INJECTION: HCPCS | Performed by: NURSE PRACTITIONER

## 2022-02-09 PROCEDURE — 74183 MRI ABD W/O CNTR FLWD CNTR: CPT

## 2022-02-09 RX ADMIN — GADOBUTROL 4 ML: 604.72 INJECTION INTRAVENOUS at 11:32

## 2022-02-15 ENCOUNTER — TELEPHONE (OUTPATIENT)
Dept: OTHER | Facility: OTHER | Age: 72
End: 2022-02-15

## 2022-02-15 NOTE — TELEPHONE ENCOUNTER
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

## 2022-02-15 NOTE — RESULT ENCOUNTER NOTE
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

## 2022-02-15 NOTE — TELEPHONE ENCOUNTER
Called and spoke with patient  MRI results were relayed to her  She is aware to follow up as scheduled with Dr Alice Phillips in March

## 2022-03-23 ENCOUNTER — OFFICE VISIT (OUTPATIENT)
Dept: UROLOGY | Facility: CLINIC | Age: 72
End: 2022-03-23
Payer: MEDICARE

## 2022-03-23 VITALS
HEART RATE: 85 BPM | SYSTOLIC BLOOD PRESSURE: 130 MMHG | HEIGHT: 60 IN | DIASTOLIC BLOOD PRESSURE: 78 MMHG | WEIGHT: 101.6 LBS | BODY MASS INDEX: 19.94 KG/M2

## 2022-03-23 DIAGNOSIS — N39.3 FEMALE STRESS INCONTINENCE: ICD-10-CM

## 2022-03-23 DIAGNOSIS — N39.41 URGE INCONTINENCE OF URINE: Primary | ICD-10-CM

## 2022-03-23 PROCEDURE — 99214 OFFICE O/P EST MOD 30 MIN: CPT | Performed by: UROLOGY

## 2022-03-23 RX ORDER — SODIUM CHLORIDE 9 MG/ML
125 INJECTION, SOLUTION INTRAVENOUS CONTINUOUS
Status: CANCELLED | OUTPATIENT
Start: 2022-03-23

## 2022-03-23 RX ORDER — CEFAZOLIN SODIUM 1 G/50ML
1000 SOLUTION INTRAVENOUS ONCE
Status: CANCELLED | OUTPATIENT
Start: 2022-06-06 | End: 2022-03-23

## 2022-03-23 NOTE — PROGRESS NOTES
3/23/2022    Nicole Sensor  1950  [id number]        Assessment  Mixed incontinence with female stress incontinence      Discussion  We discussed that the primary  of her complaints his female stress incontinence  She is wearing at least 3-4 pads per day which are always wet  This is consistent with my cystoscopic evaluation  Urodynamics shows a low pressure bladder  I feel that she would be a good candidate for trans obturator mid urethral sling insertion  The patient wishes to proceed  She understands the risks associated with placement of mesh including erosion, infection, need for mesh removal, persistent incontinence, and possible urinary retention  Surgery will likely be scheduled in May 2020  She will return to the office 1 week prior to surgery for a preoperative visit  History of Present Illness  66 y o  female with a history of mixed incontinence  I recently performed cystoscopy and found that she was unable to hold her urine with stress maneuvers  I sent her for urodynamics as the bladder appeared somewhat thick walled  Urodynamics was performed and no significant elevated filling or voiding pressures were noted  A peak pressure of 32 cm water was noted  She states that she wears 3-4 pads per day and they are always wet  She leaks with stress maneuvers as well as with some urgency  We discussed intervention for stress incontinence including a mid urethral trans obturator sling  She understands that this can sometimes exacerbated urgency of urination  AUA Symptom Score      Review of Systems  Review of Systems   Constitutional: Negative  HENT: Negative  Eyes: Negative  Respiratory: Negative  Cardiovascular: Negative  Gastrointestinal: Negative  Endocrine: Negative  Genitourinary:        Per HPI   Musculoskeletal: Negative  Skin: Negative  Allergic/Immunologic: Negative  Neurological: Negative  Hematological: Negative  Psychiatric/Behavioral: Negative  Past Medical History  Past Medical History:   Diagnosis Date    Urinary tract infection        Past Social History  No past surgical history on file  Past Family History  No family history on file  Past Social history  Social History     Socioeconomic History    Marital status:       Spouse name: Not on file    Number of children: Not on file    Years of education: Not on file    Highest education level: Not on file   Occupational History    Not on file   Tobacco Use    Smoking status: Current Every Day Smoker    Smokeless tobacco: Never Used   Vaping Use    Vaping Use: Never used   Substance and Sexual Activity    Alcohol use: Never    Drug use: Never    Sexual activity: Not Currently   Other Topics Concern    Not on file   Social History Narrative    Not on file     Social Determinants of Health     Financial Resource Strain: Not on file   Food Insecurity: Not on file   Transportation Needs: Not on file   Physical Activity: Not on file   Stress: Not on file   Social Connections: Not on file   Intimate Partner Violence: Not on file   Housing Stability: Not on file       Current Medications  Current Outpatient Medications   Medication Sig Dispense Refill    acetaminophen (Tylenol) 325 mg tablet  (Patient not taking: Reported on 1/21/2022 )      albuterol (PROVENTIL HFA,VENTOLIN HFA) 90 mcg/act inhaler Take by mouth (Patient not taking: Reported on 3/23/2022 )      albuterol (PROVENTIL HFA,VENTOLIN HFA) 90 mcg/act inhaler Inhale 2 puffs every 6 (six) hours as needed (Patient not taking: Reported on 3/23/2022 )      amoxicillin (AMOXIL) 500 mg capsule Take by mouth (Patient not taking: Reported on 1/21/2022 )      benzonatate (Tessalon Perles) 100 mg capsule Take by mouth (Patient not taking: Reported on 3/23/2022 )      CHANTIX 1 MG tablet Take 1 mg by mouth 2 (two) times a day with meals (Patient not taking: Reported on 11/17/2021 )  5    CRESTOR 10 MG tablet Take 10 mg by mouth daily (Patient not taking: Reported on 1/21/2022 )  2    diclofenac (VOLTAREN) 75 mg EC tablet Take by mouth (Patient not taking: Reported on 1/21/2022 )      fluticasone (Flonase) 50 mcg/act nasal spray  (Patient not taking: Reported on 1/21/2022 )      Loratadine (Claritin) 10 MG CAPS Take by mouth (Patient not taking: Reported on 11/17/2021 )      Mirabegron ER (MYRBETRIQ) 25 MG TB24 Take 25 mg by mouth daily (Patient not taking: Reported on 2/18/2020) 90 tablet 2    pantoprazole (PROTONIX) 40 mg tablet Take 40 mg by mouth daily (Patient not taking: Reported on 1/21/2022 )  6    Prolia 60 MG/ML  (Patient not taking: Reported on 1/21/2022 )      sulfamethoxazole-trimethoprim (BACTRIM DS) 800-160 mg per tablet  (Patient not taking: Reported on 1/21/2022 )      sulfamethoxazole-trimethoprim (BACTRIM DS) 800-160 mg per tablet  (Patient not taking: Reported on 11/17/2021 )       No current facility-administered medications for this visit  Allergies  Allergies   Allergen Reactions    Hydrocodone-Acetaminophen GI Intolerance       Past Medical History, Social History, Family History, medications and allergies were reviewed  Vitals  Vitals:    03/23/22 1528   BP: 130/78   Pulse: 85   Weight: 46 1 kg (101 lb 9 6 oz)   Height: 5' (1 524 m)       Physical Exam  Physical Exam    On examination she is in no acute distress  Gait normal   Affect normal    Results  No results found for: PSA  No results found for: GLUCOSE, CALCIUM, NA, K, CO2, CL, BUN, CREATININE  No results found for: WBC, HGB, HCT, MCV, PLT      Office Urine Dip  No results found for this or any previous visit (from the past 1 hour(s))  ]      Total visit time was 30 minutes of which over 50% was spent on counseling

## 2022-03-28 ENCOUNTER — TELEPHONE (OUTPATIENT)
Dept: OTHER | Facility: OTHER | Age: 72
End: 2022-03-28

## 2022-03-28 NOTE — TELEPHONE ENCOUNTER
I returned pt 's phone call to discuss scheduling her procedure  There was no answer so I did leave a voicemail asking her to call our office back to discuss scheduling surgery

## 2022-03-29 ENCOUNTER — TELEPHONE (OUTPATIENT)
Dept: UROLOGY | Facility: MEDICAL CENTER | Age: 72
End: 2022-03-29

## 2022-03-29 ENCOUNTER — PREP FOR PROCEDURE (OUTPATIENT)
Dept: UROLOGY | Facility: AMBULATORY SURGERY CENTER | Age: 72
End: 2022-03-29

## 2022-03-29 DIAGNOSIS — Z01.810 PREOP CARDIOVASCULAR EXAM: ICD-10-CM

## 2022-03-29 DIAGNOSIS — N39.41 URGE INCONTINENCE OF URINE: Primary | ICD-10-CM

## 2022-03-29 DIAGNOSIS — R39.89 SUSPECTED UTI: ICD-10-CM

## 2022-03-29 DIAGNOSIS — Z01.812 PRE-PROCEDURE LAB EXAM: ICD-10-CM

## 2022-03-29 DIAGNOSIS — Z01.818 PREOP EXAMINATION: ICD-10-CM

## 2022-03-29 NOTE — TELEPHONE ENCOUNTER
I returned call to pt and discuss scheduling her procedure with Dr Kareen Mendoza at the [facility]  I informed her that the next available date is 6/6/22  Pt confirmed that this date works for her  I verbally went over all of pt 's pre op instructions and prep information with her  She is aware that her pre op labs, urine culture and EKG needs to be done 2-3 weeks prior to surgery and she also needs a medical clearance appt with her PCP  Once her clearance appt is scheduled I will call her back to confirm

## 2022-03-29 NOTE — TELEPHONE ENCOUNTER
Stan Fruits routed conversation to You 2 hours ago (9:31 AM)     Stan Fruits 2 hours ago (9:29 AM)     GS       Patient of Dr Kb Trevino  Patient called to speak to Surgical Scheduler             [facility] Narcisa Lopez 2 hours ago (9:25 AM)

## 2022-03-30 NOTE — TELEPHONE ENCOUNTER
I called pt this morning to inform her that she is scheduled for her medical clearance with her PCP on 5/31/22 at 9:45AM and her EKG will be done at this appt as well  Pt will have pre op labs and urine culture done the week prior to clearance appt  Pt 's surgical packet was mailed out to pt and she was instructed to call our office with any questions or concerns regarding this procedure

## 2022-04-05 ENCOUNTER — TELEPHONE (OUTPATIENT)
Dept: OTHER | Facility: OTHER | Age: 72
End: 2022-04-05

## 2022-04-11 ENCOUNTER — TELEPHONE (OUTPATIENT)
Dept: OTHER | Facility: OTHER | Age: 72
End: 2022-04-11

## 2022-04-11 NOTE — TELEPHONE ENCOUNTER
Patient stated she has an appointment on 5/31/22 with the office and also with Dr Laureen Amezcua her pcp  on the same day  She needs a call back to discuss

## 2022-04-11 NOTE — TELEPHONE ENCOUNTER
Pt is confused about upcoming appts    Pt is asking if she will have to stay in hospital after procedure due to daughter picking her up    Pt was transferred to central scheduling to have EKG scheduled     Pt call [phone]

## 2022-04-11 NOTE — TELEPHONE ENCOUNTER
Called and spoke with patient  Patient rescheduled for H&P on 05/26/22  Patient asking about how long she will need to be in the hospital after the surgery as she needs to coordinate with her daughter that lives in [place]  Please call patient to discuss

## 2022-04-12 NOTE — TELEPHONE ENCOUNTER
I returned pt 's phone call to inform her that her procedure with Dr Opal Joshua is outpatient and if her daughter is not planning on staying with her during her procedure then the hospital will call her when pt is able to be discharged  There was no answer when I called pt so I did leave the information on her voicemail  Pt was instructed to call our office back to discuss any further questions she has

## 2022-05-20 ENCOUNTER — TELEPHONE (OUTPATIENT)
Dept: UROLOGY | Facility: MEDICAL CENTER | Age: 72
End: 2022-05-20

## 2022-05-20 NOTE — TELEPHONE ENCOUNTER
DOS 6/6/22 procedure [id number] [facility] pending [facility]#[id number] and [place]:anival no auth required ref#Maris 5/20/ at 8:58am

## 2022-05-23 ENCOUNTER — APPOINTMENT (OUTPATIENT)
Dept: LAB | Facility: HOSPITAL | Age: 72
End: 2022-05-23
Attending: UROLOGY
Payer: MEDICARE

## 2022-05-23 ENCOUNTER — HOSPITAL ENCOUNTER (OUTPATIENT)
Dept: NON INVASIVE DIAGNOSTICS | Facility: HOSPITAL | Age: 72
Discharge: HOME | End: 2022-05-23
Attending: UROLOGY
Payer: MEDICARE

## 2022-05-23 DIAGNOSIS — N39.41 URGE INCONTINENCE OF URINE: ICD-10-CM

## 2022-05-23 DIAGNOSIS — Z01.810 PREOP CARDIOVASCULAR EXAM: ICD-10-CM

## 2022-05-23 DIAGNOSIS — Z01.818 PREOP EXAMINATION: ICD-10-CM

## 2022-05-23 DIAGNOSIS — Z01.812 PRE-PROCEDURE LAB EXAM: ICD-10-CM

## 2022-05-23 DIAGNOSIS — R39.89 SUSPECTED UTI: ICD-10-CM

## 2022-05-23 LAB
ANION GAP SERPL CALCULATED.3IONS-SCNC: 8 MMOL/L (ref 4–13)
ATRIAL RATE: 76 BPM
BASOPHILS # BLD AUTO: 0.05 THOUSANDS/ΜL (ref 0–0.1)
BASOPHILS NFR BLD AUTO: 1 % (ref 0–1)
BUN SERPL-MCNC: 6 MG/DL (ref 5–25)
CALCIUM SERPL-MCNC: 9.6 MG/DL (ref 8.3–10.1)
CHLORIDE SERPL-SCNC: 101 MMOL/L (ref 100–108)
CO2 SERPL-SCNC: 30 MMOL/L (ref 21–32)
CREAT SERPL-MCNC: 0.86 MG/DL (ref 0.6–1.3)
EOSINOPHIL # BLD AUTO: 0.03 THOUSAND/ΜL (ref 0–0.61)
EOSINOPHIL NFR BLD AUTO: 0 % (ref 0–6)
ERYTHROCYTE [DISTWIDTH] IN BLOOD BY AUTOMATED COUNT: 12.8 % (ref 11.6–15.1)
GFR SERPL CREATININE-BSD FRML MDRD: 66 ML/MIN/1.73SQ M
GLUCOSE SERPL-MCNC: 94 MG/DL (ref 65–140)
HCT VFR BLD AUTO: 44.4 % (ref 34.8–46.1)
HGB BLD-MCNC: 14.3 G/DL (ref 11.5–15.4)
IMM GRANULOCYTES # BLD AUTO: 0.03 THOUSAND/UL (ref 0–0.2)
IMM GRANULOCYTES NFR BLD AUTO: 0 % (ref 0–2)
LYMPHOCYTES # BLD AUTO: 1.95 THOUSANDS/ΜL (ref 0.6–4.47)
LYMPHOCYTES NFR BLD AUTO: 24 % (ref 14–44)
MCH RBC QN AUTO: 29.9 PG (ref 26.8–34.3)
MCHC RBC AUTO-ENTMCNC: 32.2 G/DL (ref 31.4–37.4)
MCV RBC AUTO: 93 FL (ref 82–98)
MONOCYTES # BLD AUTO: 0.46 THOUSAND/ΜL (ref 0.17–1.22)
MONOCYTES NFR BLD AUTO: 6 % (ref 4–12)
NEUTROPHILS # BLD AUTO: 5.49 THOUSANDS/ΜL (ref 1.85–7.62)
NEUTS SEG NFR BLD AUTO: 69 % (ref 43–75)
NRBC BLD AUTO-RTO: 0 /100 WBCS
P AXIS: 78 DEGREES
PLATELET # BLD AUTO: 319 THOUSANDS/UL (ref 149–390)
PMV BLD AUTO: 9.6 FL (ref 8.9–12.7)
POTASSIUM SERPL-SCNC: 4.1 MMOL/L (ref 3.5–5.3)
PR INTERVAL: 168 MS
QRS AXIS: 70 DEGREES
QRSD INTERVAL: 70 MS
QT INTERVAL: 398 MS
QTC INTERVAL: 447 MS
RBC # BLD AUTO: 4.78 MILLION/UL (ref 3.81–5.12)
SODIUM SERPL-SCNC: 139 MMOL/L (ref 136–145)
T WAVE AXIS: 73 DEGREES
VENTRICULAR RATE: 76 BPM
WBC # BLD AUTO: 8.01 THOUSAND/UL (ref 4.31–10.16)

## 2022-05-23 PROCEDURE — 93005 ELECTROCARDIOGRAM TRACING: CPT

## 2022-05-23 PROCEDURE — 93010 ELECTROCARDIOGRAM REPORT: CPT | Performed by: INTERNAL MEDICINE

## 2022-05-23 PROCEDURE — 87086 URINE CULTURE/COLONY COUNT: CPT

## 2022-05-23 PROCEDURE — 80048 BASIC METABOLIC PNL TOTAL CA: CPT

## 2022-05-23 PROCEDURE — 85025 COMPLETE CBC W/AUTO DIFF WBC: CPT

## 2022-05-23 PROCEDURE — 36415 COLL VENOUS BLD VENIPUNCTURE: CPT

## 2022-05-24 LAB — BACTERIA UR CULT: NORMAL

## 2022-05-26 ENCOUNTER — OFFICE VISIT (OUTPATIENT)
Dept: UROLOGY | Facility: CLINIC | Age: 72
End: 2022-05-26
Payer: MEDICARE

## 2022-05-26 VITALS
HEART RATE: 88 BPM | BODY MASS INDEX: 19.83 KG/M2 | WEIGHT: 101 LBS | DIASTOLIC BLOOD PRESSURE: 80 MMHG | HEIGHT: 60 IN | SYSTOLIC BLOOD PRESSURE: 120 MMHG

## 2022-05-26 DIAGNOSIS — N28.89 RENAL MASS: Primary | ICD-10-CM

## 2022-05-26 LAB
SL AMB  POCT GLUCOSE, UA: NORMAL
SL AMB LEUKOCYTE ESTERASE,UA: NORMAL
SL AMB POCT BILIRUBIN,UA: NORMAL
SL AMB POCT BLOOD,UA: NORMAL
SL AMB POCT CLARITY,UA: CLEAR
SL AMB POCT COLOR,UA: YELLOW
SL AMB POCT KETONES,UA: NORMAL
SL AMB POCT NITRITE,UA: NORMAL
SL AMB POCT PH,UA: 5
SL AMB POCT SPECIFIC GRAVITY,UA: 1
SL AMB POCT URINE PROTEIN: NORMAL
SL AMB POCT UROBILINOGEN: 0.2

## 2022-05-26 PROCEDURE — 99214 OFFICE O/P EST MOD 30 MIN: CPT | Performed by: PHYSICIAN ASSISTANT

## 2022-05-26 PROCEDURE — 81002 URINALYSIS NONAUTO W/O SCOPE: CPT | Performed by: PHYSICIAN ASSISTANT

## 2022-05-26 NOTE — PROGRESS NOTES
Pre-op visit  5/26/2022      No chief complaint on file  Assessment and Plan     74 y o  female managed by Dr Arlet Summers    1  Stress urinary incontinence  2  Right renal angiomylipoma 4mm    History and physical was performed for the patients upcoming TRANSOBTURATOR MID URETHRAL SLIN INSERTION scheduled for 6/6/22 with Dr Arlet Summers for management of urinary incontinence  All questions and concerns regarding surgery and perioperative expectations have been addressed and answered  No overall changes in their health since last visit, denies any prior complications with anesthesia  No chest pains, shortness of breath, edema, rash, bruising  No recent uri/covid illness  Will proceed with surgery as planned  PREOP UCX 5/23/22 <10K MIXED GROWTH  EKG DONE  HGB 14  CREATININE 0 8      History of Present Illness  Tutu Dorantes is a 74 y o  female here for history and physical prior to their upcoming surgery  Urologic history as below:  YEARS OF MIXED URINARY INCONTINENCE, STRESS INCONTINENCE MORE BOTHERSOME THAN URGENCY  SHE IS WEARING 3 PADS inside BRIEFS MULTIPLE PER DAY AND IS ALWAYS WET  SHE VOIDS MORE IN HER BRIEFS THAN SHE DOES ON A TOILET  SHE FEELS RESTRICTED TO HER HOME DUE TO URINARY LEAKAGE  CYSTOSCOPY IN JANUARY SHOWED NORMAL BLADDER MUCOSA, WITH URINE LEAKAGE WITH STRESS MANEUVERS  SHE HAS NOT HAD PREVIOUS CATHETERIZATION, URETHRAL OR VAGINAL SURGERY  SHE HAS NOT HAD UTI OR HEMATURIA  AN MRI THIS YEAR SHOWS A 4MM RIGHT RENAL LESION C/W BENIGN ANGIOMYLIPOMA  Review of Systems   Constitutional: Negative for activity change, appetite change, chills, fever and unexpected weight change  HENT: Negative  Respiratory: Negative  Negative for shortness of breath  Cardiovascular: Negative  Negative for chest pain  Gastrointestinal: Negative for abdominal pain, diarrhea, nausea and vomiting  Endocrine: Negative  Genitourinary: Positive for urgency   Negative for decreased urine volume, difficulty urinating, dysuria, flank pain, frequency and hematuria  Musculoskeletal: Negative for back pain and gait problem  Skin: Negative  Allergic/Immunologic: Negative  Neurological: Negative  Hematological: Negative for adenopathy  Does not bruise/bleed easily  Vitals  Vitals:    05/26/22 1100   BP: 120/80   Pulse: 88   Weight: 45 8 kg (101 lb)   Height: 5' (1 524 m)       Physical Exam  Constitutional:       General: She is not in acute distress  Appearance: Normal appearance  She is not ill-appearing or diaphoretic  HENT:      Head: Normocephalic and atraumatic  Cardiovascular:      Rate and Rhythm: Normal rate and regular rhythm  Pulses: Normal pulses  Heart sounds: Normal heart sounds  Pulmonary:      Effort: Pulmonary effort is normal       Breath sounds: Normal breath sounds  Abdominal:      General: Abdomen is flat  Palpations: Abdomen is soft  Tenderness: There is no abdominal tenderness  There is no right CVA tenderness or left CVA tenderness  Hernia: No hernia is present  Musculoskeletal:      Right lower leg: No edema  Left lower leg: No edema  Skin:     Coloration: Skin is not pale  Findings: No rash  Neurological:      General: No focal deficit present  Mental Status: She is alert  Mental status is at baseline  Gait: Gait normal    Psychiatric:         Mood and Affect: Mood normal              Past Medical History  Past Medical History:   Diagnosis Date    Urinary tract infection        Past Social History  No past surgical history on file  Past Family History  No family history on file  Past Social history  Social History     Socioeconomic History    Marital status:       Spouse name: Not on file    Number of children: Not on file    Years of education: Not on file    Highest education level: Not on file   Occupational History    Not on file   Tobacco Use    Smoking status: Current Every Day Smoker    Smokeless tobacco: Never Used   Vaping Use    Vaping Use: Never used   Substance and Sexual Activity    Alcohol use: Never    Drug use: Never    Sexual activity: Not Currently   Other Topics Concern    Not on file   Social History Narrative    Not on file     Social Determinants of Health     Financial Resource Strain: Not on file   Food Insecurity: Not on file   Transportation Needs: Not on file   Physical Activity: Not on file   Stress: Not on file   Social Connections: Not on file   Intimate Partner Violence: Not on file   Housing Stability: Not on file       Current Medications  Current Outpatient Medications   Medication Sig Dispense Refill    acetaminophen (TYLENOL) 325 mg tablet       albuterol (PROVENTIL HFA,VENTOLIN HFA) 90 mcg/act inhaler Take by mouth (Patient not taking: No sig reported)      albuterol (PROVENTIL HFA,VENTOLIN HFA) 90 mcg/act inhaler Inhale 2 puffs every 6 (six) hours as needed (Patient not taking: No sig reported)      amoxicillin (AMOXIL) 500 mg capsule Take by mouth (Patient not taking: No sig reported)      benzonatate (TESSALON PERLES) 100 mg capsule Take by mouth (Patient not taking: No sig reported)      CHANTIX 1 MG tablet Take 1 mg by mouth 2 (two) times a day with meals (Patient not taking: No sig reported)  5    CRESTOR 10 MG tablet Take 10 mg by mouth daily (Patient not taking: No sig reported)  2    diclofenac (VOLTAREN) 75 mg EC tablet Take by mouth (Patient not taking: No sig reported)      fluticasone (FLONASE) 50 mcg/act nasal spray  (Patient not taking: No sig reported)      Loratadine 10 MG CAPS Take by mouth (Patient not taking: No sig reported)      Mirabegron ER (MYRBETRIQ) 25 MG TB24 Take 25 mg by mouth daily (Patient not taking: Reported on 2/18/2020) 90 tablet 2    pantoprazole (PROTONIX) 40 mg tablet Take 40 mg by mouth daily (Patient not taking: No sig reported)  6    Prolia 60 MG/ML  (Patient not taking: No sig reported)      sulfamethoxazole-trimethoprim (BACTRIM DS) 800-160 mg per tablet       sulfamethoxazole-trimethoprim (BACTRIM DS) 800-160 mg per tablet        No current facility-administered medications for this visit  Allergies  Allergies   Allergen Reactions    Hydrocodone-Acetaminophen GI Intolerance         Past Medical History, Social History, Family History, medications and allergies were reviewed

## 2022-05-31 ENCOUNTER — TELEPHONE (OUTPATIENT)
Dept: UROLOGY | Facility: MEDICAL CENTER | Age: 72
End: 2022-05-31

## 2022-06-02 ENCOUNTER — ANESTHESIA EVENT (OUTPATIENT)
Dept: PERIOP | Facility: HOSPITAL | Age: 72
End: 2022-06-02
Payer: MEDICARE

## 2022-06-02 NOTE — TELEPHONE ENCOUNTER
Brock Melendez, RN  Jose L Preciado, we have been unable to reach pt for PAT call; 6 attempts made   I attempted to call pt  To inform her that the PAT dept at the Hospital has been trying to reach her regarding her upcoming surgery  There was no answer when I called and pt 's voicemail was not set up so I could not leave a message  I then tried pt 's emergency contact to see if he he could get pt to call back  There was no answer so I did leave a voicemail asking to have the pt call our office back

## 2022-06-03 ENCOUNTER — TELEPHONE (OUTPATIENT)
Dept: OTHER | Facility: OTHER | Age: 72
End: 2022-06-03

## 2022-06-03 NOTE — PRE-PROCEDURE INSTRUCTIONS
No outpatient medications have been marked as taking [facility]  Pre procedure instructions reviewed verbalizes understanding  NPO after MN  Bathing reviewed  No NSAIDS tylenol only

## 2022-06-03 NOTE — TELEPHONE ENCOUNTER
I returned pt's phone call and I was able to speak with her  She stated that she has not heard from the hospital yet regarding her arrival time for Monday  I reminded pt that they call in between 3-6 pm and still have time to call her with that information

## 2022-06-03 NOTE — TELEPHONE ENCOUNTER
Patient stated she missed a call yesterday about the time and instructions for her procedure on Monday  Patient wasn't sure if they were calling back again today; patient is asking for a call back to discuss time and if possible to call early today since she will be busy this afternoon

## 2022-06-06 ENCOUNTER — HOSPITAL ENCOUNTER (OUTPATIENT)
Facility: HOSPITAL | Age: 72
Setting detail: OUTPATIENT SURGERY
Discharge: HOME | End: 2022-06-06
Attending: UROLOGY | Admitting: UROLOGY
Payer: MEDICARE

## 2022-06-06 ENCOUNTER — ANESTHESIA (OUTPATIENT)
Dept: PERIOP | Facility: HOSPITAL | Age: 72
End: 2022-06-06
Payer: MEDICARE

## 2022-06-06 VITALS
HEART RATE: 83 BPM | RESPIRATION RATE: 16 BRPM | DIASTOLIC BLOOD PRESSURE: 64 MMHG | BODY MASS INDEX: 19.09 KG/M2 | WEIGHT: 97.22 LBS | OXYGEN SATURATION: 97 % | TEMPERATURE: 97.1 F | SYSTOLIC BLOOD PRESSURE: 139 MMHG | HEIGHT: 60 IN

## 2022-06-06 DIAGNOSIS — N39.3 FEMALE STRESS INCONTINENCE: Primary | ICD-10-CM

## 2022-06-06 PROCEDURE — NC001 PR NO CHARGE: Performed by: UROLOGY

## 2022-06-06 PROCEDURE — 51798 US URINE CAPACITY MEASURE: CPT | Performed by: UROLOGY

## 2022-06-06 PROCEDURE — C1771 REP DEV, URINARY, W/SLING: HCPCS | Performed by: UROLOGY

## 2022-06-06 PROCEDURE — 57288 REPAIR BLADDER DEFECT: CPT | Performed by: UROLOGY

## 2022-06-06 DEVICE — TRANSOBTURATOR SLING SYSTEM WITH PRECISIONBLUE™ DESIGN
Type: IMPLANTABLE DEVICE | Site: VAGINA | Status: FUNCTIONAL
Brand: OBTRYX™ II SYSTEM - HALO

## 2022-06-06 RX ORDER — GINSENG 100 MG
CAPSULE ORAL AS NEEDED
Status: DISCONTINUED | OUTPATIENT
Start: 2022-06-06 | End: 2022-06-06 | Stop reason: HOSPADM

## 2022-06-06 RX ORDER — HYDROMORPHONE HCL/PF 1 MG/ML
0.5 SYRINGE (ML) INJECTION
Status: DISCONTINUED | OUTPATIENT
Start: 2022-06-06 | End: 2022-06-06 | Stop reason: HOSPADM

## 2022-06-06 RX ORDER — LIDOCAINE HYDROCHLORIDE AND EPINEPHRINE 10; 10 MG/ML; UG/ML
INJECTION, SOLUTION INFILTRATION; PERINEURAL AS NEEDED
Status: DISCONTINUED | OUTPATIENT
Start: 2022-06-06 | End: 2022-06-06 | Stop reason: HOSPADM

## 2022-06-06 RX ORDER — HYDROCODONE BITARTRATE AND ACETAMINOPHEN 5; 325 MG/1; MG/1
1 TABLET ORAL EVERY 4 HOURS PRN
Status: DISCONTINUED | OUTPATIENT
Start: 2022-06-06 | End: 2022-06-06 | Stop reason: HOSPADM

## 2022-06-06 RX ORDER — SODIUM CHLORIDE, SODIUM LACTATE, POTASSIUM CHLORIDE, CALCIUM CHLORIDE 600; 310; 30; 20 MG/100ML; MG/100ML; MG/100ML; MG/100ML
125 INJECTION, SOLUTION INTRAVENOUS CONTINUOUS
Status: DISCONTINUED | OUTPATIENT
Start: 2022-06-06 | End: 2022-06-06 | Stop reason: HOSPADM

## 2022-06-06 RX ORDER — CEFAZOLIN SODIUM 1 G/50ML
1000 SOLUTION INTRAVENOUS ONCE
Status: COMPLETED | OUTPATIENT
Start: 2022-06-06 | End: 2022-06-06

## 2022-06-06 RX ORDER — SODIUM CHLORIDE 9 MG/ML
INJECTION, SOLUTION INTRAVENOUS AS NEEDED
Status: DISCONTINUED | OUTPATIENT
Start: 2022-06-06 | End: 2022-06-06 | Stop reason: HOSPADM

## 2022-06-06 RX ORDER — FENTANYL CITRATE/PF 50 MCG/ML
25 SYRINGE (ML) INJECTION
Status: DISCONTINUED | OUTPATIENT
Start: 2022-06-06 | End: 2022-06-06 | Stop reason: HOSPADM

## 2022-06-06 RX ORDER — ONDANSETRON 2 MG/ML
4 INJECTION INTRAMUSCULAR; INTRAVENOUS ONCE AS NEEDED
Status: DISCONTINUED | OUTPATIENT
Start: 2022-06-06 | End: 2022-06-06 | Stop reason: HOSPADM

## 2022-06-06 RX ORDER — MAGNESIUM HYDROXIDE 1200 MG/15ML
LIQUID ORAL AS NEEDED
Status: DISCONTINUED | OUTPATIENT
Start: 2022-06-06 | End: 2022-06-06 | Stop reason: HOSPADM

## 2022-06-06 RX ORDER — FENTANYL CITRATE 50 UG/ML
INJECTION, SOLUTION INTRAMUSCULAR; INTRAVENOUS AS NEEDED
Status: DISCONTINUED | OUTPATIENT
Start: 2022-06-06 | End: 2022-06-06

## 2022-06-06 RX ORDER — DEXAMETHASONE SODIUM PHOSPHATE 10 MG/ML
INJECTION, SOLUTION INTRAMUSCULAR; INTRAVENOUS AS NEEDED
Status: DISCONTINUED | OUTPATIENT
Start: 2022-06-06 | End: 2022-06-06

## 2022-06-06 RX ORDER — MEPERIDINE HYDROCHLORIDE 25 MG/ML
12.5 INJECTION INTRAMUSCULAR; INTRAVENOUS; SUBCUTANEOUS
Status: DISCONTINUED | OUTPATIENT
Start: 2022-06-06 | End: 2022-06-06 | Stop reason: HOSPADM

## 2022-06-06 RX ORDER — ONDANSETRON 2 MG/ML
INJECTION INTRAMUSCULAR; INTRAVENOUS AS NEEDED
Status: DISCONTINUED | OUTPATIENT
Start: 2022-06-06 | End: 2022-06-06

## 2022-06-06 RX ORDER — HYDROCODONE BITARTRATE AND ACETAMINOPHEN 5; 325 MG/1; MG/1
1 TABLET ORAL EVERY 6 HOURS PRN
Qty: 5 TABLET | Refills: 0 | Status: SHIPPED | OUTPATIENT
Start: 2022-06-06 | End: 2022-06-16

## 2022-06-06 RX ORDER — PROPOFOL 10 MG/ML
INJECTION, EMULSION INTRAVENOUS AS NEEDED
Status: DISCONTINUED | OUTPATIENT
Start: 2022-06-06 | End: 2022-06-06

## 2022-06-06 RX ORDER — BUPIVACAINE HYDROCHLORIDE 2.5 MG/ML
INJECTION, SOLUTION EPIDURAL; INFILTRATION; INTRACAUDAL AS NEEDED
Status: DISCONTINUED | OUTPATIENT
Start: 2022-06-06 | End: 2022-06-06 | Stop reason: HOSPADM

## 2022-06-06 RX ORDER — LIDOCAINE HYDROCHLORIDE 20 MG/ML
INJECTION, SOLUTION EPIDURAL; INFILTRATION; INTRACAUDAL; PERINEURAL AS NEEDED
Status: DISCONTINUED | OUTPATIENT
Start: 2022-06-06 | End: 2022-06-06

## 2022-06-06 RX ORDER — EPHEDRINE SULFATE 50 MG/ML
INJECTION INTRAVENOUS AS NEEDED
Status: DISCONTINUED | OUTPATIENT
Start: 2022-06-06 | End: 2022-06-06

## 2022-06-06 RX ADMIN — FENTANYL CITRATE 25 MCG: 50 INJECTION INTRAMUSCULAR; INTRAVENOUS at 14:00

## 2022-06-06 RX ADMIN — SODIUM CHLORIDE, SODIUM LACTATE, POTASSIUM CHLORIDE, AND CALCIUM CHLORIDE 125 ML/HR: .6; .31; .03; .02 INJECTION, SOLUTION INTRAVENOUS at 12:13

## 2022-06-06 RX ADMIN — LIDOCAINE HYDROCHLORIDE 60 MG: 20 INJECTION, SOLUTION EPIDURAL; INFILTRATION; INTRACAUDAL; PERINEURAL at 13:27

## 2022-06-06 RX ADMIN — EPHEDRINE SULFATE 5 MG: 50 INJECTION, SOLUTION INTRAVENOUS at 13:46

## 2022-06-06 RX ADMIN — FENTANYL CITRATE 25 MCG: 50 INJECTION INTRAMUSCULAR; INTRAVENOUS at 13:32

## 2022-06-06 RX ADMIN — ONDANSETRON 4 MG: 2 INJECTION INTRAMUSCULAR; INTRAVENOUS at 14:06

## 2022-06-06 RX ADMIN — PROPOFOL 110 MG: 10 INJECTION, EMULSION INTRAVENOUS at 13:27

## 2022-06-06 RX ADMIN — CEFAZOLIN SODIUM 1000 MG: 1 SOLUTION INTRAVENOUS at 13:14

## 2022-06-06 RX ADMIN — DEXAMETHASONE SODIUM PHOSPHATE 4 MG: 10 INJECTION INTRAMUSCULAR; INTRAVENOUS at 13:31

## 2022-06-06 NOTE — OP NOTE
OPERATIVE REPORT  PATIENT NAME: Megan Tapia    :  1950  MRN: [id number]  Pt Location: [facility] OR ROOM 05    SURGERY DATE: 2022    Surgeon(s) and Role:     * Tatum Rowe MD - Primary    Preop Diagnosis:  Female stress incontinence [N39 3]    Post-Op Diagnosis Codes:     * Female stress incontinence [N39 3]    Procedure(s) (LRB):  INSERTION PUBOVAGINAL SLING (FEMALE) (N/A) , cystoscopy    [facility] Transobturator Halo Obtryx II       Specimen(s):  * No specimens in log *    Estimated Blood Loss:   20 mL    Drains:  none    Anesthesia Type:   General    Operative Indications:  Female stress incontinence [N39 3]      Operative Findings:  Standard mid urethral sling, negative cystoscopy     Complications:   none    Procedure and Technique:  Suzanne Qureshi is a 59-year-old female with demonstrable stress incontinence identified on cystoscopy  She is wearing at least 3-4 pads per day  There was no evidence of pelvic organ prolapse otherwise  Risk and benefits of cystoscopy with trans obturator mid urethral sling insertion were discussed and reviewed  Informed consent was obtained  The risk of mesh complication/erosion/infection/removal was discussed and reviewed  The Patient was brought to the operating room on 2022  After the smooth induction of general LMA anesthesia, the patient was placed in the dorsal lithotomy position  Her genitalia was prepped and draped in a sterile fashion  Venodyne boots had been applied  Intravenous antibiotics were administered      A Ontiveros catheter was inserted in the bladder was drained  Labial retraction sutures were not required  Vaginal speculum was placed  0 5% Marcaineutilized to anesthetize the anterior vaginal wall  It was mixed with lidocaine with epinephrine  Approximately 1 cm proximal to the urethral meatus, a 1 5 cm vertical incision was made in the anterior vaginal wall  Metzenbaum scissors were utilized to create flaps of vaginal mucosa  Care was taken to not violate the urethra or buttonhole the vaginal mucosa  The Ontiveros catheter was easily palpated within the urethra in the midline  Dissection was performed to the level of the endopelvic fascia laterally but the endopelvic fascia was not violated  A gloved finger was then easily placed into either side of the incision to continue with gentle blunt dissection  At this point anesthesia was utilized along the medial border of the obturator foramen at the level of the upper vaginal opening  An 11 Blade was utilized to make a small stab skin incision  The trocars from the trans obturator tape we then passed in an outside in fashion over top of a gloved finger inside the vaginal incision  The bladder was empty at this point  The trocars were passed onto the tip of a gloved finger  This was performed bilaterally  I then inspected the anterior vaginal wall bilaterally to ensure there was no evidence of button holing "       At this point cystoscopy was performed with a 70 degree lens  There is no evidence of bladder injury or perforation       The mesh was then placed onto the trocars and the mesh was inserted into proper position making sure that it laid flat on to the urethra  The tension was then set such that a Metzenbaum scissor could easily be passed between the urethra and the mesh  The outer covering of the mesh was then cut and removed  Attention was maintained properly on the sling  The sling itself was then cut below the level of the skin in either groin incision  All incisions were irrigated  The vaginal incision was closed with a running 2 0 Vicryl in a transverse fashion based on how the incision wanted to close  The 2 small skin incisions in the groin were closed simply with skin glue due to their small size  Vaginal packing was then placed  The vaginal speculum was removed    The Ontiveros catheter was removed      Overall the patient tolerated the procedure well number no complications  Patient was extubated in the operating room and transferred to the PACU in stable condition at the conclusion of the case      Patient Disposition:  PACU Stable and extubated      SIGNATURE: Fede Dent MD  DATE: June 6, 2022  TIME: 2:24 PM

## 2022-06-06 NOTE — ANESTHESIA PREPROCEDURE EVALUATION
Procedure:  INSERTION PUBOVAGINAL SLING (FEMALE) (N/A Vagina )    Relevant Problems   Genitourinary   (+) Urinary tract infection      Other   (+) Urinary incontinence        Physical Exam    Airway    Mallampati score: I  TM Distance: <3 FB  Neck ROM: full     Dental       Cardiovascular  Rhythm: regular, Rate: normal, Cardiovascular exam normal    Pulmonary  Pulmonary exam normal     Other Findings        Anesthesia Plan  ASA Score- 2     Anesthesia Type- general with ASA Monitors  Additional Monitors:   Airway Plan: LMA  Plan Factors-Exercise tolerance (METS): >4 METS  Chart reviewed  Existing labs reviewed  Patient summary reviewed  Patient is not a current smoker  Patient did not smoke on day of surgery  Obstructive sleep apnea risk education given perioperatively  Induction- intravenous  Postoperative Plan-     Informed Consent- Anesthetic plan and risks discussed with patient

## 2022-06-06 NOTE — ANESTHESIA POSTPROCEDURE EVALUATION
Post-Op Assessment Note    CV Status:  Stable    Pain management: adequate     Mental Status:  Alert and awake   Hydration Status:  Euvolemic   PONV Controlled:  Controlled   Airway Patency:  Patent      Post Op Vitals Reviewed: Yes      Staff: Anesthesiologist         No complications documented    /74   Pulse 80   Temp 97 9 °F (36 6 °C)   Resp 16   Ht 5' (1 524 m)   Wt 44 1 kg (97 lb 3 6 oz)   SpO2 97%   BMI 18 99 kg/m²   BP      Temp      Pulse     Resp      SpO2

## 2022-06-06 NOTE — H&P
Assessment  Mixed incontinence with female stress incontinence        Discussion  We we again reviewed her primary complaint is her female stress incontinence  She is wearing at least 3-4 pads per day which are always wet  This is consistent with my cystoscopic evaluation with Beny Jarquin stress incontinence with cough with a full bladder  Urodynamics shows a low pressure bladder  I feel that she would be a good candidate for trans obturator mid urethral sling insertion  The patient wishes to proceed  She understands the risks associated with placement of mesh including, but not limited to, pelvic pain, erosion, infection, need for mesh removal, persistent incontinence, and possible urinary retention  informed consent obtained         History of Present Illness  66 y o  female with a history of mixed incontinence  I recently performed cystoscopy and found that she was unable to hold her urine with stress maneuvers  I sent her for urodynamics as the bladder appeared somewhat thick walled  Urodynamics was performed and no significant elevated filling or voiding pressures were noted  A peak pressure of 32 cm water was noted  She states that she wears 3-4 pads per day and they are always wet  She leaks with stress maneuvers as well as with some urgency  We discussed intervention for stress incontinence including a mid urethral trans obturator sling  She understands that this can sometimes exacerbated urgency of urination  She presents today to undergo insertion of a trans obturator mid urethral sling  /67   Pulse 81   Temp 98 4 °F (36 9 °C) (Temporal)   Resp 14   Ht 5' (1 524 m)   Wt 44 1 kg (97 lb 3 6 oz)   SpO2 96%   BMI 18 99 kg/m²     On examination she is in no acute distress  She has no respiratory distress  Cardiac is regular  Abdomen is soft nontender nondistended   examination reveals no CVA tenderness  Skin is warm  Extremities without edema    HEENT normocephalic atraumatic, sclerae anicteric

## 2022-06-07 ENCOUNTER — TELEPHONE (OUTPATIENT)
Dept: UROLOGY | Facility: AMBULATORY SURGERY CENTER | Age: 72
End: 2022-06-07

## 2022-06-07 NOTE — TELEPHONE ENCOUNTER
Post Op Note    Tutu Dorantes is a 74 y o  female s/p INSERTION PUBOVAGINAL SLING (FEMALE) (N/A) , cystoscopy  performed 6/6/2022  Tutu Dorantes is a patient of Dr Arlet Summers and is seen at the [place] [id number] office  How would you rate your pain on a scale from 1 to 10, 10 being the worst pain ever? 0  Have you had a fever? No  Have your bowel movements been regular? No taking stool softeners   Do you have any difficulty urinating? No  Do you have any other questions or concerns that I can address at this time? None at this time  Patient is dong well  Follow up appt given in 4-6 weeks per the AVS  Advised to call in the meantime with concerns

## 2022-06-20 ENCOUNTER — APPOINTMENT (OUTPATIENT)
Dept: LAB | Facility: MEDICAL CENTER | Age: 72
End: 2022-06-20
Payer: MEDICARE

## 2022-06-20 ENCOUNTER — NURSE TRIAGE (OUTPATIENT)
Dept: OTHER | Facility: OTHER | Age: 72
End: 2022-06-20

## 2022-06-20 DIAGNOSIS — R30.9 PAIN WITH URINATION: ICD-10-CM

## 2022-06-20 DIAGNOSIS — R30.9 PAIN WITH URINATION: Primary | ICD-10-CM

## 2022-06-20 LAB
BACTERIA UR QL AUTO: ABNORMAL /HPF
BILIRUB UR QL STRIP: NEGATIVE
CAOX CRY URNS QL MICRO: ABNORMAL /HPF
CLARITY UR: CLEAR
COLOR UR: YELLOW
GLUCOSE UR STRIP-MCNC: NEGATIVE MG/DL
HGB UR QL STRIP.AUTO: NEGATIVE
HYALINE CASTS #/AREA URNS LPF: ABNORMAL /LPF
KETONES UR STRIP-MCNC: NEGATIVE MG/DL
LEUKOCYTE ESTERASE UR QL STRIP: ABNORMAL
MUCOUS THREADS UR QL AUTO: ABNORMAL
NITRITE UR QL STRIP: NEGATIVE
NON-SQ EPI CELLS URNS QL MICRO: ABNORMAL /HPF
PH UR STRIP.AUTO: 6 [PH]
PROT UR STRIP-MCNC: NEGATIVE MG/DL
RBC #/AREA URNS AUTO: ABNORMAL /HPF
SP GR UR STRIP.AUTO: 1.02 (ref 1–1.03)
UROBILINOGEN UR STRIP-ACNC: <2 MG/DL
WBC #/AREA URNS AUTO: ABNORMAL /HPF

## 2022-06-20 PROCEDURE — 81001 URINALYSIS AUTO W/SCOPE: CPT

## 2022-06-20 PROCEDURE — 87086 URINE CULTURE/COLONY COUNT: CPT

## 2022-06-20 NOTE — TELEPHONE ENCOUNTER
Regarding: Post Op Issue  ----- Message from [facility] sent at 6/20/2022  9:40 AM EDT -----  "I had surgery on 06/6 and I think I may have an infection  I have burning and an odor coming from my vaginal area   It started about 3 days ago and I still have 1-2 stitches down there that has not dissolved yet that should be gone "

## 2022-06-20 NOTE — TELEPHONE ENCOUNTER
Please ensure she removed her vaginal packing on post op day 1 as instructed  Agree with urine testing   Motrin/azo if needed today

## 2022-06-20 NOTE — TELEPHONE ENCOUNTER
Patient stated that she developed foul odor, some vaginal discharge, pain with urination after the procedure on 6/6/22

## 2022-06-20 NOTE — TELEPHONE ENCOUNTER
Called and spoke with patient  States she did remove vaginal packing the day after procedure as instructed  She will proceed with urine testing and is aware office will contact her with results  Also advised she may use Motrin or Azo as needed today  Patient verbalized understanding

## 2022-06-20 NOTE — TELEPHONE ENCOUNTER
S/p INSERTION PUBOVAGINAL SLING (FEMALE) (N/A Vagina ) 6/6/22 seen in our [facility] location   Post-op appt is 7/16/22   Patient states that stiches are still present , slight pain and burning with urination  Incision site she can not see  Can feel the stitch sticking out  Urine is yellow in color  Odor is reported not in urine from inside  Patient states was advised to go get urine testing will get that this afternoon       Sending to provider for recommendation

## 2022-06-20 NOTE — TELEPHONE ENCOUNTER
Reason for Disposition   MILD TO MODERATE post-op pain (e g , pain scale 1-7) that is not controlled with pain medications    Answer Assessment - Initial Assessment Questions  1  SYMPTOM: "What's the main symptom you're concerned about?" (e g , pain, fever, vomiting)      Vaginal foul odor, painful urination and burning sensation  2  ONSET: "When did post op symptoms  start?"      3 days ago   3  SURGERY: "What surgery was performed?"      Insertion pubovaginal sling   4  DATE of SURGERY: "When was surgery performed?"       6/6/22   6  PAIN: "Is there any pain?" If Yes, ask: "How bad is it?"  (Scale 1-10; or mild, moderate, severe)      2 out of 10   7  FEVER: "Do you have a fever?" If Yes, ask: "What is your temperature, how was it measured, and when did it start?"      No   8  VOMITING: "Is there any vomiting?" If yes, ask: "How many times?"      No   9  BLEEDING: "Is there any bleeding?" If Yes, ask: "How much?" and "Where?"      No   10  OTHER SYMPTOMS: "Do you have any other symptoms?" (e g , drainage from wound, painful urination, constipation)        Painful urination      Protocols used: POST-OP SYMPTOMS AND QUESTIONS-ADULT-OH

## 2022-06-21 LAB — BACTERIA UR CULT: NORMAL

## 2022-07-19 ENCOUNTER — OFFICE VISIT (OUTPATIENT)
Dept: UROLOGY | Facility: CLINIC | Age: 72
End: 2022-07-19
Payer: MEDICARE

## 2022-07-19 VITALS
WEIGHT: 96 LBS | HEART RATE: 68 BPM | DIASTOLIC BLOOD PRESSURE: 80 MMHG | BODY MASS INDEX: 18.75 KG/M2 | SYSTOLIC BLOOD PRESSURE: 124 MMHG

## 2022-07-19 DIAGNOSIS — N39.41 URGE INCONTINENCE OF URINE: Primary | ICD-10-CM

## 2022-07-19 DIAGNOSIS — L84 CORN OR CALLUS: ICD-10-CM

## 2022-07-19 DIAGNOSIS — R32 URINARY INCONTINENCE, UNSPECIFIED TYPE: ICD-10-CM

## 2022-07-19 DIAGNOSIS — N30.00 ACUTE CYSTITIS WITHOUT HEMATURIA: ICD-10-CM

## 2022-07-19 LAB
BACTERIA UR QL AUTO: ABNORMAL /HPF
BILIRUB UR QL STRIP: NEGATIVE
CLARITY UR: CLEAR
COLOR UR: YELLOW
GLUCOSE UR STRIP-MCNC: NEGATIVE MG/DL
HGB UR QL STRIP.AUTO: NEGATIVE
HYALINE CASTS #/AREA URNS LPF: ABNORMAL /LPF
KETONES UR STRIP-MCNC: NEGATIVE MG/DL
LEUKOCYTE ESTERASE UR QL STRIP: ABNORMAL
NITRITE UR QL STRIP: NEGATIVE
NON-SQ EPI CELLS URNS QL MICRO: ABNORMAL /HPF
PH UR STRIP.AUTO: 6.5 [PH]
POST-VOID RESIDUAL VOLUME, ML POC: 22 ML
PROT UR STRIP-MCNC: ABNORMAL MG/DL
RBC #/AREA URNS AUTO: ABNORMAL /HPF
SL AMB  POCT GLUCOSE, UA: NORMAL
SL AMB LEUKOCYTE ESTERASE,UA: NORMAL
SL AMB POCT BILIRUBIN,UA: NORMAL
SL AMB POCT BLOOD,UA: NORMAL
SL AMB POCT CLARITY,UA: CLEAR
SL AMB POCT COLOR,UA: YELLOW
SL AMB POCT KETONES,UA: NORMAL
SL AMB POCT NITRITE,UA: NORMAL
SL AMB POCT PH,UA: 5
SL AMB POCT SPECIFIC GRAVITY,UA: 1.01
SL AMB POCT URINE PROTEIN: NORMAL
SL AMB POCT UROBILINOGEN: 0.2
SP GR UR STRIP.AUTO: 1.02 (ref 1–1.03)
UROBILINOGEN UR STRIP-ACNC: <2 MG/DL
WBC #/AREA URNS AUTO: ABNORMAL /HPF

## 2022-07-19 PROCEDURE — 87086 URINE CULTURE/COLONY COUNT: CPT | Performed by: NURSE PRACTITIONER

## 2022-07-19 PROCEDURE — 51798 US URINE CAPACITY MEASURE: CPT | Performed by: NURSE PRACTITIONER

## 2022-07-19 PROCEDURE — 99024 POSTOP FOLLOW-UP VISIT: CPT | Performed by: NURSE PRACTITIONER

## 2022-07-19 PROCEDURE — 81002 URINALYSIS NONAUTO W/O SCOPE: CPT | Performed by: NURSE PRACTITIONER

## 2022-07-19 PROCEDURE — 81001 URINALYSIS AUTO W/SCOPE: CPT | Performed by: NURSE PRACTITIONER

## 2022-07-19 RX ORDER — MIRABEGRON 25 MG/1
25 TABLET, FILM COATED, EXTENDED RELEASE ORAL DAILY
Qty: 90 TABLET | Refills: 3 | Status: SHIPPED | OUTPATIENT
Start: 2022-07-19 | End: 2022-10-17

## 2022-07-19 NOTE — PATIENT INSTRUCTIONS
Start taking your mirabegron again  Try to drink 4 bottles of water a day    Kegel Exercises for Women   AMBULATORY CARE:   Kegel exercises  help strengthen your pelvic muscles  Pelvic muscles hold your pelvic organs, such as your bladder and uterus, in place  Kegel exercises help prevent or control problems with urine incontinence (leakage)  Incontinence may be caused by pregnancy, childbirth, or menopause  Contact your healthcare provider if:   You cannot feel your muscles tighten or relax  You continue to leak urine  You have questions or concerns about your condition or care  Use the correct muscles:  Pelvic muscles are the muscles you use to control urine flow  To target these muscles, stop and start the flow of urine several times  This will help you become familiar with how it feels to tighten and relax these muscles  How to do Kegel exercises:   Empty your bladder  You may lie down, stand up, or sit down to do these exercises  When you first try to do these exercises, it may be easier if you lie down  Tighten or squeeze your pelvic muscles slowly  It may feel like you are trying to hold back urine or gas  Hold this position for 3 seconds  Relax for 3 seconds  Repeat this cycle 10 times  Do 10 sets of Kegel exercises, at least 3 times a day  Do not hold your breath when you do Kegel exercises  Keep your stomach, back, and leg muscles relaxed  As your muscles get stronger, you will be able to hold the squeeze longer  Your healthcare provider may ask that you increase your pelvic muscle squeeze to 10 seconds  After you squeeze for 10 seconds, relax for 10 seconds  What else you should know:   Once you know how to do Kegel exercises, use different positions  You can do these exercises while you lie on the floor, sit at your desk or watch TV, and while you stand  You may notice improved bladder control within about 6 weeks       Tighten your pelvic muscles before you sneeze, cough, or lift to prevent urine leakage  Follow up with your doctor as directed:  Write down your questions so you remember to ask them during your visits  © Copyright [employer] 2022 Information is for End User's use only and may not be sold, redistributed or otherwise used for commercial purposes  All illustrations and images included in CareNotes® are the copyrighted property of A [employer] Inc  or Leah Raymond   The above information is an  only  It is not intended as medical advice for individual conditions or treatments  Talk to your doctor, nurse or pharmacist before following any medical regimen to see if it is safe and effective for you  BLADDER HEALTH    WHAT IS CONSIDERED NORMAL? The average bladder can hold about 2 cups of urine before it needs to be emptied  The normal range of voiding urine is 6 to 8 times during a 24 hour period  As we get older, our bladder capacity can get smaller and we may need to pass urine more frequently but usually not more than every 2 hours  Urine should flow easily without discomfort in a good, steady stream until the bladder is empty  No pushing or straining is necessary to empty the bladder  An urge is a signal that you feel as the bladder stretches to fill with urine  Urges can be felt even if the bladder is not full  Urges are not commands to go to the toilet, merely a signal and can be controlled  WHAT ARE GOOD BLADDER HABITS? Take your time when emptying your bladder  Dont strain or push to empty your bladder  Make sure you empty your bladder completely each time you pass urine  Do not rush the process  Consistently ignoring the urge to go (waiting more than 4 hours between toileting) or urinating too infrequently may be convenient but not healthy for your bladder  Avoid going to the toilet just in case or more often than every 2 hours  It is usually not necessary to go when you feel the first urge   Try to go only when your bladder is full  Urgency and frequency of urination can be improved by retraining the bladder and spacing your fluid intake throughout the day  Practice good toilet habits  Dont let your bladder control your life  TIPS TO MAINTAIN GOOD BLADDER HABITS  Maintain a good fluid intake  Depending on your body size and environment, drink 6 -8 cups (8 oz each) of fluid per day unless otherwise advised by your doctor  Not enough fluid creates a foul odor and dark color of the urine  Limit the amount of caffeine (coffee, cola, chocolate or tea) and citrus foods that you consume as these foods can be associated with increased sensation of urinary urgency and frequency  Limit the amount of alcohol you drink  Alcohol increases urine production and makes it difficult for the brain to coordinate bladder control  Avoid constipation by maintaining a balanced diet of dietary fiber  Cigarette smoking is also irritating to the bladder surface and is associated with bladder cancer  In addition, the coughing associated with smoking may lead to increased incontinent episodes because of the increased pressure  HOW DIET CAN AFFECT YOUR BLADDER  Although there is no particular "diet" that can cure bladder control, there are certain dietary suggestions you can use to help control the problem  There are 2 points to consider when evaluating how your habits and diet may affect your bladder:    Foods and fluids can irritate the bladder  Some foods and beverages are thought to contribute to bladder leakage and irritability  However their effect on the bladder is not completely understood and you may want to see if eliminating one or all of these items improves your bladder control      If you are unable to give them up completely, it is recommended that you use the following items in moderation:  Acidic beverages and foods (orange juice, grapefruit juice, lemonade etc)  Alcoholic beverages  Vinegar  Coffee (regular and decaf)  Tea (regular and decaf)  Caffeinated beverages  Carbonated beverages          Drinking enough and the right kinds of fluids  Many people with bladder control issues decrease their intake of liquids in hope that they will need to urinate less frequently or have less urinary leakage  You should not restrict fluids to control your bladder  While a decrease in liquid intake does result in a decrease in the volume of urine, the smaller amount of urine may be more highly concentrated  Highly concentrated, dark yellow urine is irritating to the bladder surface and may actually cause you to go to the bathroom more frequently  It also encourages the growth of bacteria, which may lead to infections resulting in incontinence  Substitutions for Bladder Irritants: water is always the best beverage choice  Grape and apple juice are thirst quenchers are good selections and are not as irritating to the bladder    Low acid fruits:  Pears, apricots, papaya, watermelon  For coffee drinkers: KAVA®, Postum®, [place]®, Kaffree Mago®  For tea drinkers:  non-citrus or herbal and sun brewed tea

## 2022-07-19 NOTE — PROGRESS NOTES
Assessment and plan:     Urinary tract infection  · Urine dip positive for nitrites  · Send urine microscopic and culture    Urinary incontinence  · S/p pubovaginal sling 6/6/2022  · Ongoing leakage  · Stopped the mirabegron, advised to resume  · Declines pelvic floor physical therapy  · Follow up 3 months for recheck      BETTIE Smith    History of Present Illness     Pepito Reyna is a 74 y o  female patient of DR Estevan Olivo s/p pubovaginal sling 6/6/2022  She continues to leak urine and her frequency has improved  Denies burning  She is still wearing 2 briefs when she goes out  She still feels that when she urinates she voids more in her brief than the toilet  At home she can get to the bathroom sometimes, she does not wear a brief at home  She reports mixed urinary incontinence  She is no longer taking mirabegron and takes no medication at all  She drinks water, cranberry juice and orange juice, dr foote  Denies large amounts of bladder irritants      Laboratory     Lab Results   Component Value Date    BUN 6 05/23/2022    CREATININE 0 86 05/23/2022       No components found for: GFR    Lab Results   Component Value Date    CALCIUM 9 6 05/23/2022    K 4 1 05/23/2022    CO2 30 05/23/2022     05/23/2022       Lab Results   Component Value Date    WBC 8 01 05/23/2022    HGB 14 3 05/23/2022    HCT 44 4 05/23/2022    MCV 93 05/23/2022     05/23/2022       No results found for: PSA    Recent Results (from the past 1 hour(s))   POCT urine dip    Collection Time: 07/19/22 10:27 AM   Result Value Ref Range    LEUKOCYTE ESTERASE,UA ++     NITRITE,UA -     SL AMB POCT UROBILINOGEN 0 2     POCT URINE PROTEIN -      PH,UA 5 0     BLOOD,UA -     SPECIFIC GRAVITY,UA 1 015     KETONES,UA -     BILIRUBIN,UA -     GLUCOSE, UA -      COLOR,UA yellow     CLARITY,UA clear    POCT Measure PVR    Collection Time: 07/19/22 10:27 AM   Result Value Ref Range    POST-VOID RESIDUAL VOLUME, ML POC 22 mL @RESULT(URINEMICROSCOPIC)@    @RESULT(URINECULTURE)@    Radiology       Review of Systems     Review of Systems   Constitutional: Negative for activity change, appetite change, chills, fatigue, fever and unexpected weight change  HENT: Negative for facial swelling  Eyes: Negative for discharge  Respiratory: Negative  Negative for cough and shortness of breath  Cardiovascular: Negative for chest pain and leg swelling  Gastrointestinal: Negative  Negative for abdominal distention, abdominal pain, constipation, diarrhea, nausea and vomiting  Endocrine: Negative  Genitourinary: Positive for urgency  Negative for decreased urine volume, difficulty urinating, dysuria, enuresis, flank pain, frequency, genital sores and hematuria  Continues with mixed urinary incontinenc   Musculoskeletal: Negative for back pain and myalgias  Skin: Negative for pallor and rash  Allergic/Immunologic: Negative  Negative for immunocompromised state  Neurological: Negative for facial asymmetry and speech difficulty  Psychiatric/Behavioral: Negative for agitation and confusion  Allergies     Allergies   Allergen Reactions    Hydrocodone-Acetaminophen GI Intolerance       Physical Exam     Physical Exam  Constitutional:       General: She is not in acute distress  Appearance: Normal appearance  She is normal weight  She is not ill-appearing, toxic-appearing or diaphoretic  HENT:      Head: Normocephalic and atraumatic  Eyes:      General: No scleral icterus  Cardiovascular:      Rate and Rhythm: Normal rate  Pulmonary:      Effort: Pulmonary effort is normal  No respiratory distress  Abdominal:      General: Abdomen is flat  There is no distension  Palpations: Abdomen is soft  Tenderness: There is no abdominal tenderness  There is no right CVA tenderness, left CVA tenderness, guarding or rebound  Musculoskeletal:         General: No swelling        Cervical back: Normal range of motion  Right lower leg: No edema  Left lower leg: No edema  Skin:     General: Skin is warm and dry  Coloration: Skin is not jaundiced or pale  Findings: No rash  Neurological:      General: No focal deficit present  Mental Status: She is alert and oriented to person, place, and time  Gait: Gait normal    Psychiatric:         Mood and Affect: Mood normal          Behavior: Behavior normal          Thought Content:  Thought content normal          Judgment: Judgment normal          Vital Signs     Vitals:    07/19/22 1011   BP: 124/80   Pulse: 68   Weight: 43 5 kg (96 lb)       Current Medications       Current Outpatient Medications:     acetaminophen (TYLENOL) 325 mg tablet, , Disp: , Rfl:     Mirabegron ER (Myrbetriq) 25 MG TB24, Take 25 mg by mouth daily, Disp: 90 tablet, Rfl: 3    albuterol (PROVENTIL HFA,VENTOLIN HFA) 90 mcg/act inhaler, Take by mouth (Patient not taking: No sig reported), Disp: , Rfl:     albuterol (PROVENTIL HFA,VENTOLIN HFA) 90 mcg/act inhaler, Inhale 2 puffs every 6 (six) hours as needed (Patient not taking: No sig reported), Disp: , Rfl:     CHANTIX 1 MG tablet, Take 1 mg by mouth 2 (two) times a day with meals (Patient not taking: No sig reported), Disp: , Rfl: 5    CRESTOR 10 MG tablet, Take 10 mg by mouth daily (Patient not taking: No sig reported), Disp: , Rfl: 2    diclofenac (VOLTAREN) 75 mg EC tablet, Take by mouth (Patient not taking: No sig reported), Disp: , Rfl:     fluticasone (FLONASE) 50 mcg/act nasal spray, , Disp: , Rfl:     Loratadine 10 MG CAPS, Take by mouth (Patient not taking: No sig reported), Disp: , Rfl:     pantoprazole (PROTONIX) 40 mg tablet, Take 40 mg by mouth daily (Patient not taking: No sig reported), Disp: , Rfl: 6    Prolia 60 MG/ML, , Disp: , Rfl:     Active Problems     Patient Active Problem List   Diagnosis    Urinary incontinence    Urinary tract infection    Female stress incontinence       Past Medical History     Past Medical History:   Diagnosis Date    Urinary tract infection        Surgical History     Past Surgical History:   Procedure Laterality Date    COLONOSCOPY      DILATION AND EVACUATION      x2    HYSTERECTOMY      LUNG LOBECTOMY Right     partial    IL SLING OPER STRES INCONTINENCE N/A 6/6/2022    Procedure: INSERTION PUBOVAGINAL SLING (FEMALE); Surgeon: Kwesi Wang MD;  Location: [facility];  Service: Urology    VAGINAL DELIVERY      x6       Family History     History reviewed  No pertinent family history  Social History     Social History     Social History     Tobacco Use   Smoking Status Current Every Day Smoker    Packs/day: 0 50    Years: 35 00    Pack years: 17 50    Types: Cigarettes   Smokeless Tobacco Never Used   Tobacco Comment    advised not to smoke  smoked 6/6       Past Surgical History:   Procedure Laterality Date    COLONOSCOPY      DILATION AND EVACUATION      x2    HYSTERECTOMY      LUNG LOBECTOMY Right     partial    IL SLING OPER STRES INCONTINENCE N/A 6/6/2022    Procedure: INSERTION PUBOVAGINAL SLING (FEMALE); Surgeon: Kwesi Wang MD;  Location: [facility];  Service: Urology    VAGINAL DELIVERY      x6         The following portions of the patient's history were reviewed and updated as appropriate: allergies, current medications, past family history, past medical history, past social history, past surgical history and problem list    Please note :  Voice dictation software has been used to create this document  There may be inadvertent transcription errors      [id number] [id number] [address]

## 2022-07-19 NOTE — ASSESSMENT & PLAN NOTE
· S/p pubovaginal sling 6/6/2022  · Ongoing leakage  · Stopped the mirabegron, advised to resume  · Declines pelvic floor physical therapy  · Follow up 3 months for recheck

## 2022-07-20 ENCOUNTER — TELEPHONE (OUTPATIENT)
Dept: UROLOGY | Facility: CLINIC | Age: 72
End: 2022-07-20

## 2022-07-20 LAB — BACTERIA UR CULT: NORMAL

## 2022-07-20 NOTE — TELEPHONE ENCOUNTER
Contacted patient and made her aware of negative urine culture results  She verbalized understanding

## 2022-08-08 ENCOUNTER — TELEPHONE (OUTPATIENT)
Dept: UROLOGY | Facility: CLINIC | Age: 72
End: 2022-08-08

## 2022-08-08 NOTE — TELEPHONE ENCOUNTER
Patient scheduled with Dr Kareen Mendoza at [facility] on 10/25/22 at 10:45

## 2022-10-24 RX ORDER — GUAIFENESIN, PSEUDOEPHEDRINE HYDROCHLORIDE 600; 60 MG/1; MG/1
TABLET, EXTENDED RELEASE ORAL
COMMUNITY
Start: 2022-06-28

## 2022-10-24 RX ORDER — AMLODIPINE BESYLATE 5 MG/1
TABLET ORAL
COMMUNITY
Start: 2022-10-18

## 2022-10-24 RX ORDER — MIRTAZAPINE 15 MG/1
TABLET, FILM COATED ORAL
COMMUNITY
Start: 2022-06-28

## 2022-10-24 RX ORDER — LIDOCAINE 4 G/G
PATCH TOPICAL
COMMUNITY
Start: 2022-10-18

## 2024-03-14 ENCOUNTER — NURSE TRIAGE (OUTPATIENT)
Age: 74
End: 2024-03-14

## 2024-03-14 DIAGNOSIS — R30.0 DYSURIA: Primary | ICD-10-CM

## 2024-03-14 NOTE — TELEPHONE ENCOUNTER
Patient called in, I added  [phone] Slaughter to the call. Patient confirmed address on file.  Patient states they had surgery and unable to follow up after the surgery as they were sick, and the surgery was useless as she continues to have leakage. She would like to schedule a follow up.    I reviewed last office note with urology,   Urinary incontinence  S/p pubovaginal sling 6/6/2022  Ongoing leakage  Stopped the mirabegron, advised to resume  Declines pelvic floor physical therapy  Follow up 3 months for recheck  Patient states she is too weak for PT. I inquired if she is having any other urinary symptoms at this time, she stated she is having all the symptoms since surgery. I asked if she was having dysuria or fever, chills, patient states she is having all the symptoms. I asked what her temperature is, she states she had it checked by her PCP and it was 97 or 98. I explained that is not a fever. She then stated she is not having a fever. Patient then stated her belly is hard for the last 3 months, states she is having regular bowel movements though and is constantly leaking urine in her depends. States she is only 80lbs and is drinking ensure and not gaining any weight.I asked if her PCP is aware of this, she states she has not seen them. I advised she get urine testing done she stated she is taking the following medications but is not sure what she is taking them for, states some kind of infection and they were given by her PCP. I reviewed according to our records she saw PCP in August, she read the prescription date and medications to me as follows:  07/01/23 Cipro and 02/2024- cefadroxil 500mg.   I asked patient again if she is taking these, she states they are empty bottles.   Unsure if this is due to language barrier or possible sepsis, but there is a lot of confusion throughout the phone call and so I inquired if I could call her emergency contact, her daughter, patient states no and that she no  "longer lives with her. Due to the confusion, unknown baseline, and patient refused to let me call daughter I asked if she has someone to drive her for urine testing or to the ED for further evaluation of symptoms. Patient states they will not go to the ED but will have urine testing done. The  line disconnected and we were reconnected with a different : [phone] Peoples.   Patient asked what lab to go to I verified address, patient then gave a different address then she originally gave at the beginning of the call. I clarified address on file and patient agreed that is her address. I then asked patient to hold and added nurse to call to help establish if this is a communication barrier or possible confusion. Nurse asked patient questions and patient answers were different from the answers she gave me. The nurse and I explained we recommend further evaluation by ED, patient refused and states she will go for urine testing only.     Answer Assessment - Initial Assessment Questions  1. SYMPTOM: \"What's the main symptom you're concerned about?\" (e.g., frequency, incontinence)      Incontinence  2. ONSET: \"When did the   Incontinence  start?\"      Ongoing since surgery 6/2022  3. PAIN: \"Is there any pain?\" If Yes, ask: \"How bad is it?\" (Scale: 1-10; mild, moderate, severe)      no  4. CAUSE: \"What do you think is causing the symptoms?\"      The surgery  5. OTHER SYMPTOMS: \"Do you have any other symptoms?\" (e.g., fever, flank pain, blood in urine, pain with urination)  Burning pain with urination.    Protocols used: Urinary Symptoms-ADULT-[place]       "

## 2024-03-15 NOTE — TELEPHONE ENCOUNTER
Call placed to patient with  [phone]. VM left by  for patient to contact the office to discuss recommendations.

## 2024-03-18 NOTE — TELEPHONE ENCOUNTER
Contacted patient with  [phone]. Call went directly to  and message was left for patient to return call to discuss her symptoms, urine testing, and follow up.

## 2024-03-19 NOTE — TELEPHONE ENCOUNTER
3rd voicemail message left for patient with  [phone] to contact the office to discuss her symptoms and recommendations.

## 2024-06-06 ENCOUNTER — OFFICE VISIT (OUTPATIENT)
Dept: UROLOGY | Facility: CLINIC | Age: 74
End: 2024-06-06
Payer: MEDICARE

## 2024-06-06 VITALS — BODY MASS INDEX: 17.28 KG/M2 | WEIGHT: 88 LBS | HEIGHT: 60 IN

## 2024-06-06 DIAGNOSIS — N39.46 MIXED STRESS AND URGE URINARY INCONTINENCE: Primary | ICD-10-CM

## 2024-06-06 DIAGNOSIS — E46 PROTEIN-CALORIE MALNUTRITION, UNSPECIFIED SEVERITY (HCC): ICD-10-CM

## 2024-06-06 LAB
POST-VOID RESIDUAL VOLUME, ML POC: 20 ML
SL AMB  POCT GLUCOSE, UA: NORMAL
SL AMB LEUKOCYTE ESTERASE,UA: NORMAL
SL AMB POCT BILIRUBIN,UA: NORMAL
SL AMB POCT BLOOD,UA: NORMAL
SL AMB POCT CLARITY,UA: CLEAR
SL AMB POCT COLOR,UA: YELLOW
SL AMB POCT KETONES,UA: NORMAL
SL AMB POCT NITRITE,UA: NORMAL
SL AMB POCT PH,UA: 7
SL AMB POCT SPECIFIC GRAVITY,UA: 1.01
SL AMB POCT URINE PROTEIN: NORMAL
SL AMB POCT UROBILINOGEN: NORMAL

## 2024-06-06 PROCEDURE — 81002 URINALYSIS NONAUTO W/O SCOPE: CPT | Performed by: PHYSICIAN ASSISTANT

## 2024-06-06 PROCEDURE — 99213 OFFICE O/P EST LOW 20 MIN: CPT | Performed by: PHYSICIAN ASSISTANT

## 2024-06-06 PROCEDURE — 51798 US URINE CAPACITY MEASURE: CPT | Performed by: PHYSICIAN ASSISTANT

## 2024-06-06 RX ORDER — FLUTICASONE FUROATE, UMECLIDINIUM BROMIDE AND VILANTEROL TRIFENATATE 100; 62.5; 25 UG/1; UG/1; UG/1
POWDER RESPIRATORY (INHALATION)
COMMUNITY
Start: 2024-02-21

## 2024-06-06 RX ORDER — GABAPENTIN 100 MG/1
100 CAPSULE ORAL 3 TIMES DAILY
COMMUNITY
Start: 2024-01-03 | End: 2025-01-02

## 2024-06-06 NOTE — PROGRESS NOTES
6/6/2024      Chief Complaint   Patient presents with   • Urinary Incontinence         Assessment and Plan    74 y.o. female managed by Dr. Burks    1. Mixed stress and urge urinary incontinence  -     POCT urine dip  -     POCT Measure PVR  -     US kidney and bladder with pvr; Future; Expected date: 06/06/2024  2. Protein-calorie malnutrition, unspecified severity (HCC)      Sonya continues to have significant mixed stress and urge incontinence, primarily stress incontinence.  She feels the urethral sling did not change her symptoms for better or worse.  She feels her lower abdomen is distended or bloated at times, she has no pain or discomfort in the bladder she just notices it looks different, she says it is not a full feeling- on the contrary- she feels like she is always leaking sometimes she does not even know anymore. She also had 1-2 episodes of some fecal incontinence which was new in the past year.    PVR 20ml today. Offered formal renal/bladder US due to her concern of pelvic distension feeling. Recommend cystoscopy with pelvic exam to re-evaluate her recent urethral sling. She wishes to see Dr Burks. Possible referral to urogynecology thereafter for alternate therapy pending those results.    History of Present Illness  Sonya Figueroa is a 74 y.o. female here for evaluation of persistent urinary incontinence she has many years of mixed stress and urge incontinence she wears multiple briefs a day she is always wet.  She underwent cystoscopy showing normal bladder mucosa, ultimately had transobturator mid urethral sling insertion with Dr. Burks June 2022.  She feels no better or worse since the surgery.  She continues to wear multiple briefs per day for incontinence.  No dysuria or hematuria.  She has been off Myrbetriq for over a year.  Urinalysis dipstick today is unremarkable.        Review of Systems   Constitutional: Negative.    Respiratory: Negative.     Cardiovascular: Negative.     Gastrointestinal:  Negative for constipation and diarrhea.   Genitourinary:  Positive for urgency. Negative for decreased urine volume, difficulty urinating, dysuria, flank pain, frequency, hematuria and pelvic pain.   Musculoskeletal: Negative.                 Vitals  Vitals:    06/06/24 0926   Weight: 39.9 kg (88 lb)   Height: 5' (1.524 m)       Physical Exam  Vitals and nursing note reviewed.   Constitutional:       General: She is not in acute distress.     Appearance: She is well-developed. She is not diaphoretic.      Comments: thin petite female   HENT:      Head: Normocephalic and atraumatic.   Pulmonary:      Effort: Pulmonary effort is normal.   Abdominal:      General: Abdomen is flat. There is no distension.      Palpations: Abdomen is soft. There is no mass.      Tenderness: There is no abdominal tenderness.      Hernia: No hernia is present.   Musculoskeletal:         General: No edema.   Skin:     General: Skin is warm and dry.   Neurological:      Mental Status: She is alert and oriented to person, place, and time.      Gait: Gait normal.   Psychiatric:         Mood and Affect: Mood and affect normal.         Speech: Speech normal.         Behavior: Behavior normal.           Past History  Past Medical History:   Diagnosis Date   • Urinary tract infection      Social History     Socioeconomic History   • Marital status:      Spouse name: None   • Number of children: None   • Years of education: None   • Highest education level: None   Occupational History   • None   Tobacco Use   • Smoking status: Every Day     Current packs/day: 0.50     Average packs/day: 0.5 packs/day for 35.0 years (17.5 ttl pk-yrs)     Types: Cigarettes   • Smokeless tobacco: Never   • Tobacco comments:     advised not to smoke. smoked 6/6   Vaping Use   • Vaping status: Never Used   Substance and Sexual Activity   • Alcohol use: Never   • Drug use: Never   • Sexual activity: Not Currently   Other Topics Concern   •  "None   Social History Narrative   • None     Social Determinants of Health     Financial Resource Strain: Not on file   Food Insecurity: Not on file   Transportation Needs: Not on file   Physical Activity: Not on file   Stress: Not on file   Social Connections: Not on file   Intimate Partner Violence: Not on file   Housing Stability: Not on file     Social History     Tobacco Use   Smoking Status Every Day   • Current packs/day: 0.50   • Average packs/day: 0.5 packs/day for 35.0 years (17.5 ttl pk-yrs)   • Types: Cigarettes   Smokeless Tobacco Never   Tobacco Comments    advised not to smoke. smoked 6/6     History reviewed. No pertinent family history.    The following portions of the patient's history were reviewed and updated as appropriate: allergies, current medications, past medical history, past social history, past surgical history and problem list.    Results  Recent Results (from the past 1 hour(s))   POCT urine dip    Collection Time: 06/06/24  9:32 AM   Result Value Ref Range    LEUKOCYTE ESTERASE,UA neg     NITRITE,UA neg     SL AMB POCT UROBILINOGEN neg     POCT URINE PROTEIN neg      PH,UA 7.0     BLOOD,UA neg     SPECIFIC GRAVITY,UA 1.010     KETONES,UA neg     BILIRUBIN,UA neg     GLUCOSE, UA neg      COLOR,UA yellow     CLARITY,UA clear    POCT Measure PVR    Collection Time: 06/06/24 10:04 AM   Result Value Ref Range    POST-VOID RESIDUAL VOLUME, ML POC 20 mL   ]  No results found for: \"PSA\"  Lab Results   Component Value Date    CALCIUM 9.6 05/23/2022    K 4.1 05/23/2022    CO2 30 05/23/2022     05/23/2022    BUN 6 05/23/2022    CREATININE 0.86 05/23/2022     Lab Results   Component Value Date    WBC 8.01 05/23/2022    HGB 14.3 05/23/2022    HCT 44.4 05/23/2022    MCV 93 05/23/2022     05/23/2022       "

## 2024-06-10 ENCOUNTER — TELEPHONE (OUTPATIENT)
Age: 74
End: 2024-06-10

## 2024-06-10 NOTE — TELEPHONE ENCOUNTER
Tried calling patient to schedule her for a sooner cysto with Dr. Burks. Informed the pt to call the office back to schedule this.    If patient calls back please offer August 21st with Dr. Burks at [facility] for a cystoscopy.

## 2024-06-10 NOTE — TELEPHONE ENCOUNTER
Patient calling in stating she was called by our office and told she needs to be seen for a cysto on 6/11/24. I do not see any notes for this in chart. Please call patient back regarding this.     CB: [phone]

## 2024-06-19 ENCOUNTER — HOSPITAL ENCOUNTER (OUTPATIENT)
Dept: ULTRASOUND IMAGING | Facility: MEDICAL CENTER | Age: 74
Discharge: HOME | End: 2024-06-19
Payer: MEDICARE

## 2024-06-19 DIAGNOSIS — N39.46 MIXED STRESS AND URGE URINARY INCONTINENCE: ICD-10-CM

## 2024-06-19 PROCEDURE — 76770 US EXAM ABDO BACK WALL COMP: CPT

## 2024-08-21 ENCOUNTER — PROCEDURE VISIT (OUTPATIENT)
Dept: UROLOGY | Facility: CLINIC | Age: 74
End: 2024-08-21
Payer: MEDICARE

## 2024-08-21 VITALS
SYSTOLIC BLOOD PRESSURE: 130 MMHG | HEIGHT: 60 IN | DIASTOLIC BLOOD PRESSURE: 80 MMHG | BODY MASS INDEX: 17.36 KG/M2 | WEIGHT: 88.4 LBS

## 2024-08-21 DIAGNOSIS — R30.0 DYSURIA: Primary | ICD-10-CM

## 2024-08-21 DIAGNOSIS — N39.46 MIXED STRESS AND URGE URINARY INCONTINENCE: ICD-10-CM

## 2024-08-21 PROCEDURE — 52000 CYSTOURETHROSCOPY: CPT | Performed by: UROLOGY

## 2024-08-21 RX ORDER — AMLODIPINE BESYLATE 10 MG/1
10 TABLET ORAL DAILY
COMMUNITY
Start: 2024-08-01

## 2024-08-21 RX ORDER — TOLTERODINE 4 MG/1
4 CAPSULE, EXTENDED RELEASE ORAL DAILY
Qty: 90 CAPSULE | Refills: 3 | Status: SHIPPED | OUTPATIENT
Start: 2024-08-21 | End: 2025-08-16

## 2024-08-21 NOTE — PROGRESS NOTES
Cystoscopy     Date/Time  8/21/2024 9:30 AM     Performed by  Alexei Burks MD   Authorized by  Alexei Burks MD         Procedure Details:  Procedure type: cystoscopy      Sonya is a 74-year-old female who underwent mid urethral sling placement in June 2022.  Prior to sling placement she had stress incontinence requiring 3-4 pads at a minimum per day.  She returns to the office today still complaining of mixed incontinence.  Cystoscopy is recommended to evaluate for the degree of stress incontinence.  When I last performed cystoscopy preoperatively in January 2022 marked stress incontinence was noted at the time of cystoscopy with cough.      Cystoscopy Procedure note:    Risk and benefits of flexible cystoscopy were discussed. Informed consent was obtained. A urine dip is adequate for cystoscopy. The patient was placed into the modified supine position. Her genitalia was prepped and draped in a sterile fashion. Viscous lidocaine was instilled into the urethra. Flexible cystoscopy was then performed. The bladder was thoroughly inspected. Both ureteral orifices were visualized with clear efflux of urine.  The bladder wall was thickened with multiple cellules.  There is no evidence of urothelial carcinoma.  The scope was removed after the bladder was left full and she immediately began to leak without increased abdominal pressure with cough or Valsalva.  Once she stopped leaking initially I had her cough and performed Valsalva and there was no evidence of stress incontinence.    Impression: Urgent continence, history of stress incontinence status post mid urethral sling    Plan: Although the patient leads initially when the scope was removed, she did not leak with cough or Valsalva maneuvers.  It is likely that she has bladder instability based on the thick-walled nature of the bladder.  I recommend a trial of Detrol LA 4 mg daily in addition to pelvic floor physical therapy.  She will return  in the next 2 to 3 months.  If she does not have improvement of her symptoms I would recommend a referral to one of my partners who performs Botox injection.  She may benefit from bladder Botox.

## 2024-09-16 NOTE — PROGRESS NOTES
PT Evaluation     Today's date: 2024  Patient name: Sonya Figueroa  : 1950  MRN: [id number]  Referring provider: Alexei Burks,*  Dx:   Encounter Diagnosis     ICD-10-CM    1. Mixed stress and urge urinary incontinence  N39.46 Ambulatory Referral to Physical Therapy            Start Time: 1200  Stop Time: 1245  Total time in clinic (min): 45 minutes    Assessment  Impairments: abnormal coordination, abnormal muscle firing, abnormal muscle tone, abnormal or restricted ROM, activity intolerance, impaired physical strength, lacks appropriate home exercise program, pain with function, poor posture  and poor body mechanics    Assessment details: Sonya is a 74 y.o. year old female with complaints of mixed urge and CONNIE.  The following impairments were found on evaluation: poor bladder habits. Patient's presentation is consistent with pelvic floor dysfunction. These impairments contribute to the following functional limitations: decreased tolerance to activity and functional mobility; and the following disability: decreased quality of life and increased risk of infection.   Sonya  is a good candidate for therapy and would benefit from skilled physical therapy to address the above impairments in order to allow the patient to achieve below goals and return to her prior level of function. Patient education provided on PFM anatomy and function, bladder diary .      Patient educated on diagnosis, plan of care and prognosis.  They are in agreement with recommended plan of care, goals for therapy and demonstrates motivation for active participation in proposed plan of care.    Thank you Dr. Burks for this referral!    Barriers to therapy: none  Understanding of Dx/Px/POC: good     Prognosis: good    Goals  STG to be completed in 8 weeks from 2024:  1. Sonya will be independent with initial home exercise program.   2. Sonya will demonstrate ability to contract, relax and actively lengthen  PFM.   3. Sonya will report 25% improvement in UI.   4. Sonya will complete bladder diary.     LTG to be completed in 12 weeks from 9/17/2024 or upon discharge from PFPT:  1. Sonya will be independent with advanced home exercise program for self-management of symptoms.  2. Sonya will demonstrate ability to appropriately activate deep core muscles with functional mobility tasks.   3. Sonya report 50% improvement or better in UI.   4. Sonya will be able to wear 2 pads or less per day to decrease risk of urinary tract infection.       Plan  Patient would benefit from: skilled physical therapy  Planned modality interventions: biofeedback, cryotherapy, TENS, thermotherapy: hydrocollator packs and ultrasound    Planned therapy interventions: abdominal trunk stabilization, activity modification, ADL retraining, ADL training, balance, balance/weight bearing training, behavior modification, body mechanics training, breathing training, coordination, flexibility, functional ROM exercises, gait training, graded activity, graded exercise, graded motor, home exercise program, IADL retraining, IASTM, joint mobilization, kinesiology taping, manual therapy, massage, Major taping, motor coordination training, muscle pump exercises, nerve gliding, neuromuscular re-education, patient education, postural training, stretching, strengthening, therapeutic activities and therapeutic exercise    Frequency: 1x week  Duration in weeks: 12  Plan of Care beginning date: 9/17/2024  Plan of Care expiration date: 12/10/2024  Treatment plan discussed with: patient, PTA and referring physician        PT Pelvic Floor Subjective:   History of Present Illness:   Sonya Figueroa is a 74 y.o. female. They present to pelvic floor physical therapy with the primary complaint of stress and urge incontinence.  They are referred to OPPT by Dr. Alexei Burks.  Sonya reports this problem began years ago.  She reports she is leaking urine all  "day and a lot.  She reports she leaks urine with cough and sneeze.  She has gross urinary incontinence on the way to the bathroom.  She wears 2 pampers and 2 pads. She changes them frequently t/o the day. She reports she does get UTIs.  She getting a UTI every 3-4 months.  She reports she is taking medication for this but it does not help.  The doctor told her the sling is working, they changed the medication and that she needs to work on her muscles.      Ipad  [id number] [id number]    Per Referring Provider Notes:   \"Sonya is a 74-year-old female who underwent mid urethral sling placement in June 2022.  Prior to sling placement she had stress incontinence requiring 3-4 pads at a minimum per day.  She returns to the office today still complaining of mixed incontinence.  Cystoscopy is recommended to evaluate for the degree of stress incontinence.  When I last performed cystoscopy preoperatively in January 2022 marked stress incontinence was noted at the time of cystoscopy with cough.    Risk and benefits of flexible cystoscopy were discussed. Informed consent was obtained. A urine dip is adequate for cystoscopy. The patient was placed into the modified supine position. Her genitalia was prepped and draped in a sterile fashion. Viscous lidocaine was instilled into the urethra. Flexible cystoscopy was then performed. The bladder was thoroughly inspected. Both ureteral orifices were visualized with clear efflux of urine.  The bladder wall was thickened with multiple cellules.  There is no evidence of urothelial carcinoma.  The scope was removed after the bladder was left full and she immediately began to leak without increased abdominal pressure with cough or Valsalva.  Once she stopped leaking initially I had her cough and performed Valsalva and there was no evidence of stress incontinence.     Impression: Urgent continence, history of stress incontinence status post mid urethral sling     Plan: Although the " "patient leads initially when the scope was removed, she did not leak with cough or Valsalva maneuvers.  It is likely that she has bladder instability based on the thick-walled nature of the bladder.  I recommend a trial of Detrol LA 4 mg daily in addition to pelvic floor physical therapy.  She will return in the next 2 to 3 months.  If she does not have improvement of her symptoms I would recommend a referral to one of my partners who performs Botox injection.  She may benefit from bladder Botox.\"    Social Support:     Lives in:  Apartment    Lives with:  Alone    Relationship status:     Employment status: does not work.  Diet and Exercise:      Has to do stairs to second floor   OB/ gyn History    Gestational History:     Prior Pregnancy: Yes      Number of prior pregnancies: 6    Number of term pregnancies: 7+    Delivery Type: vaginal delivery    Bladder Function:     Voiding Difficulties positive for: urgency and incomplete emptying      Voiding Difficulties negative for: straining       Voiding Difficulties comments:     Voiding frequency: every 15-30 minutes    Urinary leakage aggravated by: post-void dribble    Intake (ounces):     Intake (ounces) comment: Water 2 botles of water   3 ensure   Coffee 2 cups   Bowel Function:     Bowel Function comments:  Sometimes will have stool come out with urination     Denies pain or straining with bowel movements     She does sometimes have FI, pebble in underpants   Reports this does not happen too often- 1 time every couple months     Bowel frequency: daily    [place] Stool Scale: type 1 and type 2  Pain: \"it gets swollen\"          :  Patient Goals:     Other patient goals:  \"I dont even know the kind of therapy the doctor just sent me here\"      Objective           Precautions: standard, fall 9/17/2024          Visit Number: #1 #2 #3 #4 #5 #6 #7 #8   Outcome Measure:           Patient Ed           PFM anatomy and function KB          Bladder diary  3 " copies provided           Urge deferral            Bladder training                                             Neuro Re-Ed                                                                                        Ther Ex           Warm-up                                                                                         Ther Activity                                 Manual           PFM assessment if indicated                                                        Modalities

## 2024-09-17 ENCOUNTER — EVALUATION (OUTPATIENT)
Age: 74
End: 2024-09-17
Payer: MEDICARE

## 2024-09-17 DIAGNOSIS — N39.46 MIXED STRESS AND URGE URINARY INCONTINENCE: Primary | ICD-10-CM

## 2024-09-17 PROCEDURE — 97161 PT EVAL LOW COMPLEX 20 MIN: CPT

## 2024-09-17 PROCEDURE — 97530 THERAPEUTIC ACTIVITIES: CPT

## 2024-09-23 NOTE — PROGRESS NOTES
"Daily Note     Today's date: 2024  Patient name: Sonya Figueroa  : 1950  MRN: [id number]  Referring provider: Alexei Burks,*  Dx:   Encounter Diagnosis     ICD-10-CM    1. Mixed stress and urge urinary incontinence  N39.46           Start Time: 0915  Stop Time: 1000  Total time in clinic (min): 45 minutes    Subjective:  Pt completed bladder diary.  She says they are \"incomplete\" because she does not have her glasses so she can't see well.  She is agreeable to try bladder training.     She does not drink a lot of water- 2 little bottles.  Does drink juice, coffee, tea.  Used to drink whole pot of coffee and now drinks 3 cups per day.      Ipad : Kodi [id number]      Objective: See treatment diary below      Assessment: Sonya Figueroa tolerated today's treatment session well.  Patient education provided on bladder diary , urge deferral strategy, and bladder training .  Sonya completed all TE with good form and no adverse reactions. Extensive time spent on bladder training and urge deferral with teach back.  Explained why urge deferral is important and how this can help her symptoms.  Sonya continues to benefit from skilled OPPT services to address  urinary incontinence . Will continue to address functional deficits and focus on progression of POC per patient tolerance.          Plan: Continue per plan of care.  Progress treatment as tolerated.       Precautions: standard, fall       2024         Visit Number: #1 #2 #3 #4 #5 #6 #7 #8   Outcome Measure:           Patient Ed           PFM anatomy and function KB          Bladder diary  3 copies provided  Reviewed          Urge deferral   Reviewed          Bladder training   Working toward voiding every 2-4 hours          Decrease bladder irritants, increase water intake   KB- pt feels she is drinking enough fluids                                Neuro Re-Ed                                                               "                          Ther Ex           Warm-up                                                                                         Ther Activity                                 Manual           PFM assessment if indicated                                                        Modalities

## 2024-09-24 ENCOUNTER — OFFICE VISIT (OUTPATIENT)
Age: 74
End: 2024-09-24
Payer: MEDICARE

## 2024-09-24 DIAGNOSIS — N39.46 MIXED STRESS AND URGE URINARY INCONTINENCE: Primary | ICD-10-CM

## 2024-09-24 PROCEDURE — 97112 NEUROMUSCULAR REEDUCATION: CPT

## 2024-10-01 ENCOUNTER — OFFICE VISIT (OUTPATIENT)
Age: 74
End: 2024-10-01
Payer: MEDICARE

## 2024-10-01 DIAGNOSIS — N39.46 MIXED STRESS AND URGE URINARY INCONTINENCE: Primary | ICD-10-CM

## 2024-10-01 PROCEDURE — 97112 NEUROMUSCULAR REEDUCATION: CPT

## 2024-10-01 PROCEDURE — 97140 MANUAL THERAPY 1/> REGIONS: CPT

## 2024-10-01 NOTE — PROGRESS NOTES
Daily Note     Today's date: 10/1/2024  Patient name: Sonya Figueroa  : 1950  MRN: [id number]  Referring provider: Alexei Burks,*  Dx:   Encounter Diagnosis     ICD-10-CM    1. Mixed stress and urge urinary incontinence  N39.46           Start Time: 915  Stop Time: 945  Total time in clinic (min): 30 minutes    Subjective: Pt reports bladder is about the same.  She has tried using the urge deferral strategy, but it does not work for her.  She is still having urinary incontinence.  When she get up she does not have enough time and has UI on the way to the bathroom.  She has tried decreasing coffee but this is not helping.  She is drinking 2.5 bottles of water a day.  She is having 1 coffee, no juice.  She is able to repeat back urge deferral strategy, but reports this does not help her.      I pad  Mary Lou [id number]      Objective: See treatment diary below    Pelvic Floor Muscle Exam:  Exam position: supine    Visual Inspection of Perineum:  Intact and Gaping Introitus    Excursion of perineal body with contraction of pelvic floor muscles: fair  Excursion of perineal body with bearing down of pelvic floor muscles: Good       Pelvic Organ Prolapse:  Exam Position: Hooklying    At Rest: None  With Bearing Down: None    Pelvic Floor Muscles MMT:   Pelvic Floor Muscle Contraction: unable to Isolate; generalized substitution  Pelvic Floor Muscle Relaxation: complete  Breath Holding: yes  PERF-Power Right: 3/5  PERF-Power Left: 3/5  PERF-Endurance:  5 seconds  PERF-Reps (# to fatigue): 5  PERF- Flicks: not assessed     No pain with exam       Assessment: Sonya Figueroa tolerated today's treatment session well.  Patient education provided on  exam findings and HEP for PFM strengthening .  Sonya completed all TE with good form and no adverse reactions. Pt has good ROM and fair strength of PFMs.  She has adequate strength to perform urge deferral strategy.  Encouraged her to complete HEP for  "PFM strengthening and continue implementing urge deferral to help manage urge incontinent of urine.  Sonya continues to benefit from skilled OPPT services to address  UI . Will continue to address functional deficits and focus on progression of POC per patient tolerance.      *utilized ipad  to explain and gain consent for internal PFM assessment. Utilized  to explain step by step exam and answer any questions.       Plan: Continue per plan of care.  Progress treatment as tolerated.       Precautions: standard, fall    Access Code: [email or web address]  URL: [email or web address]  Date: 10/01/2024  Prepared by: Daysi Loja    Exercises  - Seated Pelvic Floor Contraction  - 3 x daily - 1 sets - 10 reps - 5 hold  - Seated Hip Abduction with Pelvic Floor Contraction and Resistance Loop  - 1 x daily - 1 sets - 10 reps - 5 hold  - Seated Pelvic Floor Contraction with Isometric Hip Adduction  - 1 x daily - 1 sets - 10 reps - 5 hold       9/17/2024 9/24/2024 10/1/2024         Visit Number: #1 #2 #3 #4 #5 #6 #7 #8   Outcome Measure:           Patient Ed           PFM anatomy and function KB          Bladder diary  3 copies provided  Reviewed          Urge deferral   Reviewed          Bladder training   Working toward voiding every 2-4 hours          Decrease bladder irritants, increase water intake   KB- pt feels she is drinking enough fluids                                Neuro Re-Ed                                                                                        Ther Ex           Warm-up            Seated PFM contraction    5\" hold x10 HEP        Seated PFM contraction with hip add    5\" hold x10 HEP        Seated PFM contraction with hip abd    5\" hold x10 HEP                                                     Ther Activity                                 Manual           PFM assessment if indicated    KB                                                    Modalities                    "

## 2024-10-14 NOTE — PROGRESS NOTES
"Daily Note/Re-Eval     Today's date: 10/14/2024  Patient name: Sonya Figueroa  : 1950  MRN: [id number]  Referring provider: Alexei Burks,*  Dx:   Encounter Diagnosis     ICD-10-CM    1. Mixed stress and urge urinary incontinence  N39.46                      Subjective: ***    Bladder Function:     Voiding Difficulties positive for:   urgency --> ***  incomplete emptying  --> ***     Voiding frequency: every 15-30 minutes --> ***     Urinary leakage aggravated by: post-void dribble --> ***     Intake (ounces) comment: Water 2 botles of water --> ***   3 ensure --> ***   Coffee 2 cups --> ***     Bowel Function:     Bowel Function comments:  Sometimes will have stool come out with urination --> ***      Denies pain or straining with bowel movements --> ***      She does sometimes have FI, pebble in underpants --> ***   Reports this does not happen too often- 1 time every couple months --> ***     Bowel frequency: daily --> ***     [place] Stool Scale: type 1 and type 2 --> ***     Pain: \"it gets swollen\"  --> ***            :  Patient Goals:     Other patient goals:  \"I dont even know the kind of therapy the doctor just sent me here\" --> ***     Goals  STG to be completed in 8 weeks from 2024:  1. Sonya will be independent with initial home exercise program. ***  2. Sonya will demonstrate ability to contract, relax and actively lengthen PFM. ***  3. Sonya will report 25% improvement in UI. ***  4. Sonya will complete bladder diary. ***     LTG to be completed in 12 weeks from 2024 or upon discharge from PFPT:  1. Sonya will be independent with advanced home exercise program for self-management of symptoms. ***  2. Sonya will demonstrate ability to appropriately activate deep core muscles with functional mobility tasks.  ***  3. Sonya report 50% improvement or better in UI. ***  4. Sonya will be able to wear 2 pads or less per day to decrease risk of urinary tract " infection. ***    NEW goals: ***      Objective: See treatment diary below      Assessment: Sonya is a 74 y.o. year old female with diagnosis of ***.  Sonya has attended *** therapy sessions from start of POC. The following impairments were found on re-evaluation: ***. They have made the following improvements from start of plan of care: *** . These impairments contribute to the following functional limitations: ***; and the following disability: decreased quality of life and ***.   Sonya  is a good candidate for therapy and would benefit from skilled physical therapy to address the above impairments in order to allow the patient to achieve below goals and return to {khgender:[id number]} prior level of function.      Patient educated on diagnosis, plan of care and prognosis.  They are in agreement with recommended plan of care, goals for therapy and demonstrates motivation for active participation in proposed plan of care.      Plan: {PLAN:[id number]}  Patient would benefit from: skilled physical therapy  Planned modality interventions: biofeedback, cryotherapy, TENS, thermotherapy: hydrocollator packs and ultrasound     Planned therapy interventions: abdominal trunk stabilization, activity modification, ADL retraining, ADL training, balance, balance/weight bearing training, behavior modification, body mechanics training, breathing training, coordination, flexibility, functional ROM exercises, gait training, graded activity, graded exercise, graded motor, home exercise program, IADL retraining, IASTM, joint mobilization, kinesiology taping, manual therapy, massage, Major taping, motor coordination training, muscle pump exercises, nerve gliding, neuromuscular re-education, patient education, postural training, stretching, strengthening, therapeutic activities and therapeutic exercise     Frequency: 1x week  Duration in weeks: ***  Plan of Care beginning date: 10/15/2024  Plan of Care expiration date:  "***  Treatment plan discussed with: patient, PTA and referring physician     Precautions: standard, fall    Access Code: [email or web address]  URL: [email or web address]  Date: 10/01/2024  Prepared by: Daysi Loja    Exercises  - Seated Pelvic Floor Contraction  - 3 x daily - 1 sets - 10 reps - 5 hold  - Seated Hip Abduction with Pelvic Floor Contraction and Resistance Loop  - 1 x daily - 1 sets - 10 reps - 5 hold  - Seated Pelvic Floor Contraction with Isometric Hip Adduction  - 1 x daily - 1 sets - 10 reps - 5 hold       9/17/2024 9/24/2024 10/1/2024  10/15/2024       Visit Number: #1 #2 #3 #4 #5 #6 #7 #8   Outcome Measure:           Patient Ed           PFM anatomy and function KB          Bladder diary  3 copies provided  Reviewed          Urge deferral   Reviewed          Bladder training   Working toward voiding every 2-4 hours          Decrease bladder irritants, increase water intake   KB- pt feels she is drinking enough fluids                                Neuro Re-Ed                                                                                        Ther Ex           Warm-up            Seated PFM contraction    5\" hold x10 HEP        Seated PFM contraction with hip add    5\" hold x10 HEP        Seated PFM contraction with hip abd    5\" hold x10 HEP                                                     Ther Activity                                 Manual           PFM assessment if indicated    KB                                                    Modalities                                          "

## 2024-10-15 ENCOUNTER — APPOINTMENT (OUTPATIENT)
Age: 74
End: 2024-10-15
Payer: MEDICARE

## 2024-10-15 DIAGNOSIS — N39.46 MIXED STRESS AND URGE URINARY INCONTINENCE: Primary | ICD-10-CM

## 2025-07-23 ENCOUNTER — TELEPHONE (OUTPATIENT)
Dept: PHYSICAL THERAPY | Facility: OTHER | Age: 75
End: 2025-07-23

## 2025-07-23 NOTE — TELEPHONE ENCOUNTER
Patient called requesting an appt with an Orthopedic.    Spoke with patient, explained her call was transferred to comprehensive spine Program line. I explained the program, protocol and what we offer.    Patient declined advanced PT. Patient states she already has tried PT at [facility] with no improvement. She was also established with [facility] spine [facility].    Patient would like to see an Orthopedic for second opinion. Patient states she is having lower back pain.    Will transfer call to Ortho for further assistance with scheduling her appt.    I also provided direct number for [facility] (pain management).    Patient thanked me and I wished her a good day.

## 2025-07-29 ENCOUNTER — APPOINTMENT (OUTPATIENT)
Dept: RADIOLOGY | Facility: MEDICAL CENTER | Age: 75
End: 2025-07-29
Attending: EMERGENCY MEDICINE
Payer: MEDICARE

## 2025-07-29 ENCOUNTER — OFFICE VISIT (OUTPATIENT)
Dept: OBGYN CLINIC | Facility: MEDICAL CENTER | Age: 75
End: 2025-07-29
Payer: MEDICARE

## 2025-07-29 VITALS — HEIGHT: 60 IN | BODY MASS INDEX: 17.28 KG/M2 | WEIGHT: 88 LBS

## 2025-07-29 DIAGNOSIS — M43.9 CURVATURE OF SPINE: ICD-10-CM

## 2025-07-29 DIAGNOSIS — M54.50 LOW BACK PAIN, UNSPECIFIED BACK PAIN LATERALITY, UNSPECIFIED CHRONICITY, UNSPECIFIED WHETHER SCIATICA PRESENT: ICD-10-CM

## 2025-07-29 DIAGNOSIS — G89.29 CHRONIC RIGHT-SIDED LOW BACK PAIN WITHOUT SCIATICA: Primary | ICD-10-CM

## 2025-07-29 DIAGNOSIS — M54.50 CHRONIC RIGHT-SIDED LOW BACK PAIN WITHOUT SCIATICA: Primary | ICD-10-CM

## 2025-07-29 DIAGNOSIS — M47.816 LUMBAR SPONDYLOSIS: ICD-10-CM

## 2025-07-29 PROCEDURE — 99204 OFFICE O/P NEW MOD 45 MIN: CPT | Performed by: EMERGENCY MEDICINE

## 2025-07-29 PROCEDURE — 72100 X-RAY EXAM L-S SPINE 2/3 VWS: CPT

## 2025-07-31 ENCOUNTER — TELEPHONE (OUTPATIENT)
Dept: OBGYN CLINIC | Facility: MEDICAL CENTER | Age: 75
End: 2025-07-31

## 2025-07-31 ENCOUNTER — TELEPHONE (OUTPATIENT)
Age: 75
End: 2025-07-31

## 2025-07-31 DIAGNOSIS — M54.50 CHRONIC RIGHT-SIDED LOW BACK PAIN WITHOUT SCIATICA: Primary | ICD-10-CM

## 2025-07-31 DIAGNOSIS — G89.29 CHRONIC RIGHT-SIDED LOW BACK PAIN WITHOUT SCIATICA: Primary | ICD-10-CM

## 2025-07-31 RX ORDER — METHYLPREDNISOLONE 4 MG/1
TABLET ORAL
Qty: 1 EACH | Refills: 0 | Status: SHIPPED | OUTPATIENT
Start: 2025-07-31

## 2025-08-19 ENCOUNTER — HOSPITAL ENCOUNTER (OUTPATIENT)
Dept: MRI IMAGING | Facility: HOSPITAL | Age: 75
Discharge: HOME | End: 2025-08-19
Attending: EMERGENCY MEDICINE
Payer: MEDICARE

## 2025-08-19 DIAGNOSIS — M43.9 CURVATURE OF SPINE: ICD-10-CM

## 2025-08-19 DIAGNOSIS — M47.816 LUMBAR SPONDYLOSIS: ICD-10-CM

## 2025-08-19 DIAGNOSIS — M54.50 CHRONIC RIGHT-SIDED LOW BACK PAIN WITHOUT SCIATICA: ICD-10-CM

## 2025-08-19 DIAGNOSIS — G89.29 CHRONIC RIGHT-SIDED LOW BACK PAIN WITHOUT SCIATICA: ICD-10-CM

## 2025-08-19 PROCEDURE — 72148 MRI LUMBAR SPINE W/O DYE: CPT

## 2025-08-21 ENCOUNTER — CONSULT (OUTPATIENT)
Dept: PAIN MEDICINE | Facility: MEDICAL CENTER | Age: 75
End: 2025-08-21
Payer: MEDICARE

## 2025-08-21 VITALS — HEIGHT: 60 IN | BODY MASS INDEX: 17.28 KG/M2 | WEIGHT: 88 LBS

## 2025-08-21 DIAGNOSIS — M47.816 LUMBAR SPONDYLOSIS: ICD-10-CM

## 2025-08-21 DIAGNOSIS — G89.29 CHRONIC RIGHT-SIDED LOW BACK PAIN WITH RIGHT-SIDED SCIATICA: ICD-10-CM

## 2025-08-21 DIAGNOSIS — M43.9 CURVATURE OF SPINE: ICD-10-CM

## 2025-08-21 DIAGNOSIS — M54.41 CHRONIC RIGHT-SIDED LOW BACK PAIN WITH RIGHT-SIDED SCIATICA: ICD-10-CM

## 2025-08-21 DIAGNOSIS — M54.16 LUMBAR RADICULITIS: Primary | ICD-10-CM

## 2025-08-21 PROCEDURE — G2211 COMPLEX E/M VISIT ADD ON: HCPCS | Performed by: PHYSICAL MEDICINE & REHABILITATION

## 2025-08-21 PROCEDURE — 99204 OFFICE O/P NEW MOD 45 MIN: CPT | Performed by: PHYSICAL MEDICINE & REHABILITATION

## (undated) DEVICE — BETHLEHEM UNIVERSAL MINOR VAG: Brand: CARDINAL HEALTH

## (undated) DEVICE — CATH FOLEY COUNCIL 18FR 5ML 2 WAY LUBRICATH

## (undated) DEVICE — INTENDED FOR TISSUE SEPARATION, AND OTHER PROCEDURES THAT REQUIRE A SHARP SURGICAL BLADE TO PUNCTURE OR CUT.: Brand: BARD-PARKER SAFETY BLADES SIZE 15, STERILE

## (undated) DEVICE — GLOVE INDICATOR PI UNDERGLOVE SZ 8 BLUE

## (undated) DEVICE — DRAPE EQUIPMENT RF WAND

## (undated) DEVICE — SCD SEQUENTIAL COMPRESSION COMFORT SLEEVE MEDIUM KNEE LENGTH: Brand: KENDALL SCD

## (undated) DEVICE — PENCIL ELECTROSURG E-Z CLEAN -0035H

## (undated) DEVICE — TUBING SUCTION 5MM X 12 FT

## (undated) DEVICE — SPONGE LAP 18 X 18 IN STRL RFD

## (undated) DEVICE — SYRINGE 10ML LL

## (undated) DEVICE — SUT SILK 2-0 SH 30 IN K833H

## (undated) DEVICE — CYSTO TUBING SINGLE IRRIGATION

## (undated) DEVICE — PACKING VAGINAL 2 IN

## (undated) DEVICE — 2000CC GUARDIAN II: Brand: GUARDIAN

## (undated) DEVICE — INTENDED FOR TISSUE SEPARATION, AND OTHER PROCEDURES THAT REQUIRE A SHARP SURGICAL BLADE TO PUNCTURE OR CUT.: Brand: BARD-PARKER SAFETY BLADES SIZE 11, STERILE

## (undated) DEVICE — DRAPE TOWEL: Brand: CONVERTORS

## (undated) DEVICE — MEDI-VAC YANKAUER SUCTION HANDLE W/BULBOUS AND CONTROL VENT: Brand: CARDINAL HEALTH

## (undated) DEVICE — ADHESIVE SKIN HIGH VISCOSITY EXOFIN 1ML

## (undated) DEVICE — 3M™ IOBAN™ 2 ANTIMICROBIAL INCISE DRAPE 6650EZ: Brand: IOBAN™ 2

## (undated) DEVICE — SUT MONOCRYL 4-0 PS-2 27 IN Y426H

## (undated) DEVICE — SUT VICRYL 3-0 SH 27 IN J416H

## (undated) DEVICE — NEEDLE 25G X 1 1/2

## (undated) DEVICE — GLOVE SRG BIOGEL 7.5